# Patient Record
Sex: FEMALE | Race: WHITE | NOT HISPANIC OR LATINO | Employment: OTHER | URBAN - METROPOLITAN AREA
[De-identification: names, ages, dates, MRNs, and addresses within clinical notes are randomized per-mention and may not be internally consistent; named-entity substitution may affect disease eponyms.]

---

## 2021-01-25 ENCOUNTER — RECORDS - HEALTHEAST (OUTPATIENT)
Dept: LAB | Facility: CLINIC | Age: 86
End: 2021-01-25

## 2021-01-26 LAB
25(OH)D3 SERPL-MCNC: 41.7 NG/ML (ref 30–80)
ALBUMIN SERPL-MCNC: 3.5 G/DL (ref 3.5–5)
ALP SERPL-CCNC: 61 U/L (ref 45–120)
ALT SERPL W P-5'-P-CCNC: 15 U/L (ref 0–45)
ANION GAP SERPL CALCULATED.3IONS-SCNC: 14 MMOL/L (ref 5–18)
AST SERPL W P-5'-P-CCNC: 23 U/L (ref 0–40)
BILIRUB SERPL-MCNC: 0.7 MG/DL (ref 0–1)
BUN SERPL-MCNC: 12 MG/DL (ref 8–28)
CALCIUM SERPL-MCNC: 8.5 MG/DL (ref 8.5–10.5)
CHLORIDE BLD-SCNC: 104 MMOL/L (ref 98–107)
CO2 SERPL-SCNC: 18 MMOL/L (ref 22–31)
CREAT SERPL-MCNC: 0.8 MG/DL (ref 0.6–1.1)
ERYTHROCYTE [DISTWIDTH] IN BLOOD BY AUTOMATED COUNT: 12.7 % (ref 11–14.5)
GFR SERPL CREATININE-BSD FRML MDRD: >60 ML/MIN/1.73M2
GLUCOSE BLD-MCNC: 103 MG/DL (ref 70–125)
HCT VFR BLD AUTO: 40.1 % (ref 35–47)
HGB BLD-MCNC: 12.8 G/DL (ref 12–16)
MCH RBC QN AUTO: 28.8 PG (ref 27–34)
MCHC RBC AUTO-ENTMCNC: 31.9 G/DL (ref 32–36)
MCV RBC AUTO: 90 FL (ref 80–100)
PLATELET # BLD AUTO: 268 THOU/UL (ref 140–440)
PMV BLD AUTO: 9 FL (ref 8.5–12.5)
POTASSIUM BLD-SCNC: 4.5 MMOL/L (ref 3.5–5)
PROT SERPL-MCNC: 6.4 G/DL (ref 6–8)
RBC # BLD AUTO: 4.45 MILL/UL (ref 3.8–5.4)
SODIUM SERPL-SCNC: 136 MMOL/L (ref 136–145)
TSH SERPL DL<=0.005 MIU/L-ACNC: 2.91 UIU/ML (ref 0.3–5)
VIT B12 SERPL-MCNC: 686 PG/ML (ref 213–816)
WBC: 6.8 THOU/UL (ref 4–11)

## 2021-07-02 ENCOUNTER — HOSPITAL ENCOUNTER (INPATIENT)
Facility: CLINIC | Age: 86
LOS: 4 days | Discharge: NURSING, REHAB OR LONG-TERM CARE | DRG: 536 | End: 2021-07-06
Attending: EMERGENCY MEDICINE | Admitting: INTERNAL MEDICINE
Payer: MEDICARE

## 2021-07-02 ENCOUNTER — APPOINTMENT (OUTPATIENT)
Dept: CT IMAGING | Facility: CLINIC | Age: 86
DRG: 536 | End: 2021-07-02
Attending: EMERGENCY MEDICINE
Payer: MEDICARE

## 2021-07-02 ENCOUNTER — DOCUMENTATION ONLY (OUTPATIENT)
Dept: OTHER | Facility: CLINIC | Age: 86
End: 2021-07-02

## 2021-07-02 ENCOUNTER — APPOINTMENT (OUTPATIENT)
Dept: GENERAL RADIOLOGY | Facility: CLINIC | Age: 86
DRG: 536 | End: 2021-07-02
Attending: EMERGENCY MEDICINE
Payer: MEDICARE
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
## 2021-07-02 DIAGNOSIS — F41.9 ANXIETY: Primary | ICD-10-CM

## 2021-07-02 DIAGNOSIS — S32.592A CLOSED FRACTURE OF RAMUS OF LEFT PUBIS, INITIAL ENCOUNTER (H): ICD-10-CM

## 2021-07-02 LAB
ANION GAP SERPL CALCULATED.3IONS-SCNC: <1 MMOL/L (ref 3–14)
BASOPHILS # BLD AUTO: 0.1 10E9/L (ref 0–0.2)
BASOPHILS NFR BLD AUTO: 0.7 %
BUN SERPL-MCNC: 14 MG/DL (ref 7–30)
CALCIUM SERPL-MCNC: 8.6 MG/DL (ref 8.5–10.1)
CHLORIDE SERPL-SCNC: 100 MMOL/L (ref 94–109)
CO2 SERPL-SCNC: 33 MMOL/L (ref 20–32)
CREAT SERPL-MCNC: 0.88 MG/DL (ref 0.52–1.04)
DIFFERENTIAL METHOD BLD: ABNORMAL
EOSINOPHIL # BLD AUTO: 0.5 10E9/L (ref 0–0.7)
EOSINOPHIL NFR BLD AUTO: 5.2 %
ERYTHROCYTE [DISTWIDTH] IN BLOOD BY AUTOMATED COUNT: 13.2 % (ref 10–15)
GFR SERPL CREATININE-BSD FRML MDRD: 59 ML/MIN/{1.73_M2}
GLUCOSE SERPL-MCNC: 109 MG/DL (ref 70–99)
HCT VFR BLD AUTO: 37.4 % (ref 35–47)
HGB BLD-MCNC: 11.7 G/DL (ref 11.7–15.7)
IMM GRANULOCYTES # BLD: 0.1 10E9/L (ref 0–0.4)
IMM GRANULOCYTES NFR BLD: 1.2 %
LABORATORY COMMENT REPORT: NORMAL
LYMPHOCYTES # BLD AUTO: 1.1 10E9/L (ref 0.8–5.3)
LYMPHOCYTES NFR BLD AUTO: 12.8 %
MCH RBC QN AUTO: 29.3 PG (ref 26.5–33)
MCHC RBC AUTO-ENTMCNC: 31.3 G/DL (ref 31.5–36.5)
MCV RBC AUTO: 94 FL (ref 78–100)
MONOCYTES # BLD AUTO: 0.6 10E9/L (ref 0–1.3)
MONOCYTES NFR BLD AUTO: 7.4 %
NEUTROPHILS # BLD AUTO: 6.3 10E9/L (ref 1.6–8.3)
NEUTROPHILS NFR BLD AUTO: 72.7 %
NRBC # BLD AUTO: 0 10*3/UL
NRBC BLD AUTO-RTO: 0 /100
PLATELET # BLD AUTO: 206 10E9/L (ref 150–450)
POTASSIUM SERPL-SCNC: 4.2 MMOL/L (ref 3.4–5.3)
RBC # BLD AUTO: 4 10E12/L (ref 3.8–5.2)
SARS-COV-2 RNA RESP QL NAA+PROBE: NEGATIVE
SODIUM SERPL-SCNC: 130 MMOL/L (ref 133–144)
SPECIMEN SOURCE: NORMAL
WBC # BLD AUTO: 8.7 10E9/L (ref 4–11)

## 2021-07-02 PROCEDURE — 120N000001 HC R&B MED SURG/OB

## 2021-07-02 PROCEDURE — 73502 X-RAY EXAM HIP UNI 2-3 VIEWS: CPT

## 2021-07-02 PROCEDURE — 250N000013 HC RX MED GY IP 250 OP 250 PS 637: Performed by: PHYSICIAN ASSISTANT

## 2021-07-02 PROCEDURE — 99223 1ST HOSP IP/OBS HIGH 75: CPT | Mod: AI | Performed by: INTERNAL MEDICINE

## 2021-07-02 PROCEDURE — 99285 EMERGENCY DEPT VISIT HI MDM: CPT | Mod: 25

## 2021-07-02 PROCEDURE — 250N000011 HC RX IP 250 OP 636: Performed by: PHYSICIAN ASSISTANT

## 2021-07-02 PROCEDURE — 87635 SARS-COV-2 COVID-19 AMP PRB: CPT | Performed by: EMERGENCY MEDICINE

## 2021-07-02 PROCEDURE — C9803 HOPD COVID-19 SPEC COLLECT: HCPCS

## 2021-07-02 PROCEDURE — 99207 PR APP CREDIT; MD BILLING SHARED VISIT: CPT | Performed by: PHYSICIAN ASSISTANT

## 2021-07-02 PROCEDURE — 85025 COMPLETE CBC W/AUTO DIFF WBC: CPT | Performed by: EMERGENCY MEDICINE

## 2021-07-02 PROCEDURE — 80048 BASIC METABOLIC PNL TOTAL CA: CPT | Performed by: EMERGENCY MEDICINE

## 2021-07-02 PROCEDURE — 250N000013 HC RX MED GY IP 250 OP 250 PS 637: Performed by: EMERGENCY MEDICINE

## 2021-07-02 PROCEDURE — 70450 CT HEAD/BRAIN W/O DYE: CPT | Mod: MG

## 2021-07-02 RX ORDER — OXYCODONE HYDROCHLORIDE 5 MG/1
5 TABLET ORAL
Status: DISCONTINUED | OUTPATIENT
Start: 2021-07-02 | End: 2021-07-06 | Stop reason: HOSPADM

## 2021-07-02 RX ORDER — LISINOPRIL 5 MG/1
5 TABLET ORAL 2 TIMES DAILY
Status: DISCONTINUED | OUTPATIENT
Start: 2021-07-02 | End: 2021-07-06 | Stop reason: HOSPADM

## 2021-07-02 RX ORDER — ONDANSETRON 2 MG/ML
4 INJECTION INTRAMUSCULAR; INTRAVENOUS EVERY 6 HOURS PRN
Status: DISCONTINUED | OUTPATIENT
Start: 2021-07-02 | End: 2021-07-06 | Stop reason: HOSPADM

## 2021-07-02 RX ORDER — QUETIAPINE FUMARATE 25 MG/1
25 TABLET, FILM COATED ORAL 2 TIMES DAILY PRN
Status: DISCONTINUED | OUTPATIENT
Start: 2021-07-02 | End: 2021-07-06 | Stop reason: HOSPADM

## 2021-07-02 RX ORDER — DONEPEZIL HYDROCHLORIDE 10 MG/1
10 TABLET, FILM COATED ORAL AT BEDTIME
Status: DISCONTINUED | OUTPATIENT
Start: 2021-07-02 | End: 2021-07-06 | Stop reason: HOSPADM

## 2021-07-02 RX ORDER — NALOXONE HYDROCHLORIDE 0.4 MG/ML
0.4 INJECTION, SOLUTION INTRAMUSCULAR; INTRAVENOUS; SUBCUTANEOUS
Status: DISCONTINUED | OUTPATIENT
Start: 2021-07-02 | End: 2021-07-06 | Stop reason: HOSPADM

## 2021-07-02 RX ORDER — ALPRAZOLAM 0.25 MG
0.25 TABLET ORAL
COMMUNITY
End: 2021-07-09

## 2021-07-02 RX ORDER — POLYETHYLENE GLYCOL 3350 17 G/17G
17 POWDER, FOR SOLUTION ORAL DAILY
Status: DISCONTINUED | OUTPATIENT
Start: 2021-07-02 | End: 2021-07-06 | Stop reason: HOSPADM

## 2021-07-02 RX ORDER — LORAZEPAM 2 MG/ML
0.5 INJECTION INTRAMUSCULAR DAILY PRN
Status: DISCONTINUED | OUTPATIENT
Start: 2021-07-02 | End: 2021-07-06 | Stop reason: HOSPADM

## 2021-07-02 RX ORDER — LIDOCAINE 40 MG/G
CREAM TOPICAL
Status: DISCONTINUED | OUTPATIENT
Start: 2021-07-02 | End: 2021-07-06 | Stop reason: HOSPADM

## 2021-07-02 RX ORDER — IBUPROFEN 400 MG/1
400 TABLET, FILM COATED ORAL 2 TIMES DAILY PRN
Status: DISCONTINUED | OUTPATIENT
Start: 2021-07-02 | End: 2021-07-06 | Stop reason: HOSPADM

## 2021-07-02 RX ORDER — DONEPEZIL HYDROCHLORIDE 10 MG/1
10 TABLET, FILM COATED ORAL AT BEDTIME
COMMUNITY

## 2021-07-02 RX ORDER — ACETAMINOPHEN 500 MG
1000 TABLET ORAL 2 TIMES DAILY
Status: ON HOLD | COMMUNITY
End: 2021-07-06

## 2021-07-02 RX ORDER — OXYBUTYNIN CHLORIDE 5 MG/1
5 TABLET ORAL 3 TIMES DAILY
Status: DISCONTINUED | OUTPATIENT
Start: 2021-07-02 | End: 2021-07-06 | Stop reason: HOSPADM

## 2021-07-02 RX ORDER — ACETAMINOPHEN 500 MG
1000 TABLET ORAL 3 TIMES DAILY
COMMUNITY

## 2021-07-02 RX ORDER — ACETAMINOPHEN 500 MG
1000 TABLET ORAL ONCE
Status: COMPLETED | OUTPATIENT
Start: 2021-07-02 | End: 2021-07-02

## 2021-07-02 RX ORDER — MULTIVITAMIN,THERAPEUTIC
1 TABLET ORAL DAILY
COMMUNITY

## 2021-07-02 RX ORDER — METOPROLOL TARTRATE 50 MG
50 TABLET ORAL 2 TIMES DAILY
COMMUNITY

## 2021-07-02 RX ORDER — METOPROLOL TARTRATE 50 MG
50 TABLET ORAL 2 TIMES DAILY
Status: DISCONTINUED | OUTPATIENT
Start: 2021-07-02 | End: 2021-07-06 | Stop reason: HOSPADM

## 2021-07-02 RX ORDER — SODIUM PHOSPHATE,MONO-DIBASIC 19G-7G/118
1 ENEMA (ML) RECTAL DAILY
COMMUNITY

## 2021-07-02 RX ORDER — OLANZAPINE 5 MG/1
5 TABLET, ORALLY DISINTEGRATING ORAL DAILY PRN
Status: DISCONTINUED | OUTPATIENT
Start: 2021-07-02 | End: 2021-07-06 | Stop reason: HOSPADM

## 2021-07-02 RX ORDER — LISINOPRIL 5 MG/1
5 TABLET ORAL 2 TIMES DAILY
COMMUNITY

## 2021-07-02 RX ORDER — NALOXONE HYDROCHLORIDE 0.4 MG/ML
0.2 INJECTION, SOLUTION INTRAMUSCULAR; INTRAVENOUS; SUBCUTANEOUS
Status: DISCONTINUED | OUTPATIENT
Start: 2021-07-02 | End: 2021-07-06 | Stop reason: HOSPADM

## 2021-07-02 RX ORDER — OXYBUTYNIN CHLORIDE 5 MG/1
5 TABLET ORAL 3 TIMES DAILY
COMMUNITY

## 2021-07-02 RX ORDER — ALPRAZOLAM 0.25 MG
0.25 TABLET ORAL
Status: DISCONTINUED | OUTPATIENT
Start: 2021-07-02 | End: 2021-07-06 | Stop reason: HOSPADM

## 2021-07-02 RX ORDER — AMOXICILLIN 250 MG
1 CAPSULE ORAL 2 TIMES DAILY PRN
Status: DISCONTINUED | OUTPATIENT
Start: 2021-07-02 | End: 2021-07-06 | Stop reason: HOSPADM

## 2021-07-02 RX ORDER — ONDANSETRON 4 MG/1
4 TABLET, ORALLY DISINTEGRATING ORAL EVERY 6 HOURS PRN
Status: DISCONTINUED | OUTPATIENT
Start: 2021-07-02 | End: 2021-07-06 | Stop reason: HOSPADM

## 2021-07-02 RX ORDER — AMOXICILLIN 250 MG
2 CAPSULE ORAL 2 TIMES DAILY PRN
Status: DISCONTINUED | OUTPATIENT
Start: 2021-07-02 | End: 2021-07-06 | Stop reason: HOSPADM

## 2021-07-02 RX ORDER — ACETAMINOPHEN 325 MG/1
975 TABLET ORAL 3 TIMES DAILY
Status: DISCONTINUED | OUTPATIENT
Start: 2021-07-02 | End: 2021-07-06 | Stop reason: HOSPADM

## 2021-07-02 RX ADMIN — ENOXAPARIN SODIUM 40 MG: 40 INJECTION SUBCUTANEOUS at 19:40

## 2021-07-02 RX ADMIN — OXYBUTYNIN CHLORIDE 5 MG: 5 TABLET ORAL at 19:44

## 2021-07-02 RX ADMIN — OLANZAPINE 5 MG: 5 TABLET, ORALLY DISINTEGRATING ORAL at 18:51

## 2021-07-02 RX ADMIN — LISINOPRIL 5 MG: 5 TABLET ORAL at 19:35

## 2021-07-02 RX ADMIN — SERTRALINE HYDROCHLORIDE 50 MG: 50 TABLET ORAL at 21:44

## 2021-07-02 RX ADMIN — ACETAMINOPHEN 1000 MG: 500 TABLET, FILM COATED ORAL at 16:08

## 2021-07-02 RX ADMIN — METOPROLOL TARTRATE 50 MG: 50 TABLET, FILM COATED ORAL at 19:35

## 2021-07-02 RX ADMIN — ACETAMINOPHEN 975 MG: 325 TABLET, FILM COATED ORAL at 19:35

## 2021-07-02 RX ADMIN — ALPRAZOLAM 0.25 MG: 0.25 TABLET ORAL at 19:35

## 2021-07-02 RX ADMIN — DONEPEZIL HYDROCHLORIDE 10 MG: 10 TABLET ORAL at 21:44

## 2021-07-02 RX ADMIN — IBUPROFEN 400 MG: 400 TABLET, FILM COATED ORAL at 18:52

## 2021-07-02 ASSESSMENT — MIFFLIN-ST. JEOR: SCORE: 1187.88

## 2021-07-02 ASSESSMENT — ENCOUNTER SYMPTOMS: BACK PAIN: 0

## 2021-07-02 ASSESSMENT — ACTIVITIES OF DAILY LIVING (ADL): ADLS_ACUITY_SCORE: 23

## 2021-07-02 NOTE — ED TRIAGE NOTES
Pt arrived by EMS. Pt has hx of Alzheimer. Pt stated that what she remembered walking and all of a sudden she tripped and fell. Pt hit lower back but denies any pain. Pt stated that she didn't feel dizzy or lightheaded before tripping and falling. Pt arrived in C collar. Not in blood thinners.

## 2021-07-02 NOTE — PHARMACY-ADMISSION MEDICATION HISTORY
Admission medication history interview status for this patient is complete. See [facility] admission navigator for allergy information, prior to admission medications and immunization status.     Medication history interview done, indicate source(s): Patient  Medication history resources (including written lists, pill bottles, clinic record):[facility]  Pharmacy: -    Changes made to PTA medication list:  Added: all listed  Deleted: -  Changed: -    Actions taken by pharmacist (provider contacted, etc):None     Additional medication history information:None    Medication reconciliation/reorder completed by provider prior to medication history?  no   (Y/N)     For patients on insulin therapy:   Do you use sliding scale insulin based on blood sugars?   What is your pre-meal insulin coverage?    Do you typically eat three meals a day?   How many times do you check your blood glucose per day?   How many episodes of hypoglycemia do you typically have per month?   Do you have a Continuous Glucose Monitor (CGM)?      Prior to Admission medications    Medication Sig Last Dose Taking? Auth Provider   acetaminophen (TYLENOL) 500 MG tablet Take 1,000 mg by mouth 2 times daily 7/2/2021 at x1 Yes Unknown, Entered By History   acetaminophen (TYLENOL) 500 MG tablet Take 1,000 mg by mouth daily as needed for mild pain  Yes Unknown, Entered By History   ALPRAZolam (XANAX) 0.25 MG tablet Take 0.25 mg by mouth daily at 4pm 7/1/2021 at Unknown time Yes Unknown, Entered By History   Calcium Carbonate (CALCIUM-CARB 600 PO) Take 1 tablet by mouth 2 times daily 7/2/2021 at x1 Yes Unknown, Entered By History   donepezil (ARICEPT) 10 MG tablet Take 10 mg by mouth At Bedtime 7/1/2021 at Unknown time Yes Unknown, Entered By History   glucosamine-chondroitin 500-400 MG CAPS per capsule Take 1 capsule by mouth daily 7/2/2021 at Unknown time Yes Unknown, Entered By History   lisinopril (ZESTRIL) 5 MG tablet Take 5 mg by mouth 2 times daily 7/2/2021 at x1  Yes Unknown, Entered By History   metoprolol tartrate (LOPRESSOR) 50 MG tablet Take 50 mg by mouth 2 times daily 7/2/2021 at x1 Yes Unknown, Entered By History   multivitamin, therapeutic (THERA-VIT) TABS tablet Take 1 tablet by mouth daily 7/2/2021 at Unknown time Yes Unknown, Entered By History   oxybutynin (DITROPAN) 5 MG tablet Take 5 mg by mouth 3 times daily  Yes Unknown, Entered By History   sertraline (ZOLOFT) 50 MG tablet Take 50 mg by mouth At Bedtime 7/1/2021 at Unknown time Yes Unknown, Entered By History

## 2021-07-02 NOTE — ED NOTES
[facility]  ED Nurse Handoff Report    Alejandra Paulino is a 88 year old female   ED Chief complaint: Fall  . ED Diagnosis:   Final diagnoses:   Closed fracture of ramus of left pubis, initial encounter (H)   Fall, initial encounter     Allergies: No Known Allergies    Code Status: Full Code  Activity level - Baseline/Home:  Independent. But family report that she SHOULD use a cane/walker Activity Level - Current:   Assist X 2.  Lift room needed: No. Bariatric: No   Needed: No   Isolation: No. Infection: Not Applicable.     Vital Signs:   Vitals:    07/02/21 1415 07/02/21 1430 07/02/21 1515 07/02/21 1600   BP: 135/62 130/68 137/64 118/66   Pulse: 76 71 72 82   Resp:       Temp:       TempSrc:       SpO2: 94% 93% 94% 94%   Height:           Cardiac Rhythm:  ,      Pain level:    Patient confused: Yes. Patient Falls Risk: Yes.   Elimination Status: Has voided   Patient Report - Initial Complaint: fall. Focused Assessment:Alejandra Roldan is a 88 year old female with history of Alzheimer who presents for the evaluation after a fall. Per EMS, the patient tripped on a curb earlier today at her nursing facility [facility] and they were concerned with patient hitting her head, therefore c-collar was applied. She stated remembering that she was walking and all of a sudden tripped and fell hitting her lower back, but on exam here denies any pain. She denied feeling dizzy or lightheaded prior to tripping and falling. She denies being on blood thinners.L hip pain, CMS intact.    Tests Performed: labs, head CT, pelvis XR. Abnormal Results:   Labs Ordered and Resulted from Time of ED Arrival Up to the Time of Departure from the ED   CBC WITH PLATELETS DIFFERENTIAL - Abnormal; Notable for the following components:       Result Value    MCHC 31.3 (*)     All other components within normal limits   BASIC METABOLIC PANEL - Abnormal; Notable for the following components:    Sodium 130 (*)     Carbon  Dioxide 33 (*)     Anion Gap <1 (*)     Glucose 109 (*)     GFR Estimate 59 (*)     All other components within normal limits   SARS-COV-2 (COVID-19) VIRUS RT-PCR     XR Pelvis and Hip Left 1 View   Final Result   IMPRESSION: Left superior and inferior pubic ramus fractures. This   raises the possibility of additional, otherwise occult pelvic ring or   sacral injury. No evidence of left hip fracture or joint space   narrowing.      TIM NICOLE MD             SYSTEM ID:  [id number]      CT Head w/o Contrast   Final Result   IMPRESSION:  Diffuse cerebral volume loss and cerebral white matter   changes consistent with chronic small vessel ischemic disease. No   evidence for acute intracranial pathology.         Radiation dose for this scan was reduced using automated exposure   control, adjustment of the mA and/or kV according to patient size, or   iterative reconstruction technique.      GRACIA LÓPEZ MD             SYSTEM ID:  [id number]      .   Treatments provided: tylenol  Family Comments: daughter at bedside and is up to date on POC.  OBS brochure/video discussed/provided to patient:  No  ED Medications:   Medications   acetaminophen (TYLENOL) tablet 1,000 mg (1,000 mg Oral Given 7/2/21 1608)     Drips infusing:  No  For the majority of the shift, the patient's behavior Green. Interventions performed were n/a.    Sepsis treatment initiated: No     Patient tested for COVID 19 prior to admission: YES    ED Nurse Name/Phone Number: Carla Jenkins RN,   4:48 PM    RECEIVING UNIT ED HANDOFF REVIEW    Above ED Nurse Handoff Report was reviewed: Yes  Reviewed by: Mandy Luciano RN on July 2, 2021 at 5:19 PM

## 2021-07-02 NOTE — ED PROVIDER NOTES
"  History   Chief Complaint:  Fall       HPI   Alejandra Roldan is a 88 year old female with history of Alzheimer who presents for the evaluation after a fall. Per EMS, the patient tripped on a curb earlier today at her nursing facility [facility] and they were concerned with patient hitting her head, therefore c-collar was applied. She stated remembering that she was walking and all of a sudden tripped and fell hitting her lower back, but on exam here denies any pain. She denied feeling dizzy or lightheaded prior to tripping and falling. She denies being on blood thinners.     Review of Systems   Unable to perform ROS: Dementia   Musculoskeletal: Negative for back pain.     Allergies:  Allergies have not been reviewed    Medications:  No current outpatient medications on file.    Past Medical History:    No past medical history on file.    Past Surgical History:    No past surgical history on file.     Family History:    No family history on file.    Social History:  The patient was brought to the emergency department by EMS.  The patient presents to the emergency department with her daughter.  The patient lives at nursing facility.    Physical Exam     Patient Vitals for the past 24 hrs:   BP Temp Temp src Pulse Resp SpO2 Height   07/02/21 1600 118/66 -- -- 82 -- 94 % --   07/02/21 1515 137/64 -- -- 72 -- 94 % --   07/02/21 1430 130/68 -- -- 71 -- 93 % --   07/02/21 1415 135/62 -- -- 76 -- 94 % --   07/02/21 1408 137/62 98.4  F (36.9  C) Oral 76 16 92 % 1.626 m (5' 4\")   07/02/21 1400 137/62 -- -- 69 -- 92 % --       Physical Exam  Constitutional: Alert, attentive, confused to recent events at baseline, GCS 15  HENT:    Nose: Nose normal.    Mouth/Throat: Oropharynx is clear, mucous membranes are moist    TMs clear bilaterally  Eyes: EOM are normal.   CV: regular rate and rhythm; no murmurs, rubs or gallups  Chest: Effort normal and breath sounds normal.   GI:  There is no tenderness. No distension. Normal bowel " sounds  MSK: Normal range of motion to the upper and lower extremities, vague left pelvis and hip tenderness   Pelvis stable   Unable to sit up or stand without left pelvic / hip tenderness   C-spine cleared by NEXUS   No tenderness or stepoffs to the C/T/L-spine   Normal, atraumatic inspection to the back   Neurological: Alert, attentive  Skin: Skin is warm and dry.      Emergency Department Course     Imaging:    XR Pelvis and Hip Left 1 View  Left superior and inferior pubic ramus fractures. This  raises the possibility of additional, otherwise occult pelvic ring or  sacral injury. No evidence of left hip fracture or joint space  narrowing.    TIM NICOLE MD      CT Head w/o Contrast  Diffuse cerebral volume loss and cerebral white matter  changes consistent with chronic small vessel ischemic disease. No  evidence for acute intracranial pathology.    GRACIA LÓPEZ MD      Laboratory:    CBC: WBC 8.7, HGB 11.7,      BMP: Glucose 109 (H), Sodium: 130 (L), Carbon Dioxide: 33 (H), GFR: 59 (L), Anion gap: <1 (L) o/w WNL (Creatinine: 0.88)    Asymptomatic COVID-19 Virus (Coronavirus) by PCR Nasopharyngeal swab: Negative    Emergency Department Course:    Reviewed:  I reviewed nursing notes, vitals, past medical history    Assessments/Consults    1412 I obtained history and examined the patient as noted above.     Interventions:  1608 Tylenol 1000 mg oral    Disposition:  The patient was admitted to the hospital under the care of Dr. Gregory.       Impression & Plan     Medical Decision Making:  This is a pleasant 88-year-old female with history of Alzheimer's who presents after witnessed fall onto the left buttock and hip with concern for closed head injury.  She has a normal neurologic exam.  Given the above, CT head was performed and shows no skull fracture, intracranial hemorrhage, or other acute process.  C-spine is cleared by Nexus criteria.  X-ray of the left hip shows superior and inferior pubic ramus  fractures.  She is unable to sit up and ambulates will be admitted to inpatient medicine for PT/OT, orthopedic consult, likely TCU placement.  CT pelvis deferred to the inpatient team based on x-ray findings.  Screening labs are reassuring and believe she is safe for admission at this time.    Covid-19  Alejandra Roldan was evaluated during a global COVID-19 pandemic, which necessitated consideration that the patient might be at risk for infection with the SARS-CoV-2 virus that causes COVID-19.   Applicable protocols for evaluation were followed during the patient's care.   COVID-19 was considered as part of the patient's evaluation. The plan for testing is:  a test was obtained during this visit.    Diagnosis:    ICD-10-CM    1. Closed fracture of ramus of left pubis, initial encounter (H)  S32.592A Asymptomatic SARS-CoV-2 COVID-19 Virus (Coronavirus) by PCR   2. Fall, initial encounter  W19.XXXA        Scribe Disclosure:  Roby PETE, am serving as a scribe at 2:13 PM on 7/2/2021 to document services personally performed by Owen Rhodes MD based on my observations and the provider's statements to me.              Owen Rhodes MD  07/02/21 [id number]

## 2021-07-02 NOTE — H&P
History and Physical     Alejandra Paulino MRN# [id number]   YOB: 1933 Age: 88 year old      Date of Admission:  7/2/2021    Primary care provider: Shruthi Hernandez Physician          Assessment and Plan:   Alejandra Paulino is a 88 year old female with a PMH significant for dementia living in a memory care unit, hypertension and pacemaker placement who presents after mechanical fall with left hip pain.    Patient was discussed with Dr. Rhodes, who was provider in ED. Chart review of ED work up was reviewed as well as chart review of Care Everywhere, previous visits and admissions.     #Intractable left hip pain  #Left inferior and superior pubic rami fracture  Patient tripped over a curb today landing on her left hip.  She was able to ambulate to the ambulance but since that time has been complaining of intractable left hip pain and inability to stand.  X-ray imaging reveals left superior and inferior pubic rami fractures.  My exam she is still able to passively move the leg with rotation of the hip without severe pain so doubt hip fracture.  She does have pain when she does a straight leg lift on the left side.  -Orthopedics consult  -Consider further imaging if unable to bear weight after multimodal pain regimen  -Schedule Tylenol  -As needed ibuprofen  -Since I am starting ibuprofen will also add omeprazole  -Ice hip  -Oxycodone available for severe pain  -PT and social work consults    #Alzheimer's dementia  -Continue evening Xanax  -Continue Aricept and Zoloft  -PRN Seroquel, Zyprexa and Ativan ordered for agitation    #Pacemaker placement  Records do not indicate what her underlying rhythm was that necessitated a pacemaker placement    #HTN  -Continue metoprolol 50 mg twice daily and lisinopril 5 mg daily        Social: Lives in memory care  Code: Discussed with patient and they have chosen DNR/DNI  VTE prophylaxis: Lovenox  Disposition: Inpatient                    Chief Complaint:    Left hip pain         History of Present Illness:   History is limited as patient has severe dementia.  History is provided by the patient, her daughter and the ER provider.    Alejandra Paulino is a 88 year old female who presents after mechanical fall with left hip pain.  She does not have recollection of this but per the ER note she tripped on a curb earlier today while at her memory care facility and she hit her head.  She was able to ambulate to the ambulance but since being in the ER has been complaining of left hip pain and inability to stand.  She denies shortness of breath, cough, chest pain, abdominal pain, nausea, vomiting, headache, lightheadedness, dizziness, diarrhea and urinary symptoms.  She does not smoke cigarettes or drink alcohol daily.             Past Medical History:   Dementia  Hypertension  Arrhythmia with pacemaker placement            Past Surgical History:   Pacemaker placement          Social History:     Social History     Socioeconomic History     Marital status:      Spouse name: Not on file     Number of children: Not on file     Years of education: Not on file     Highest education level: Not on file   Occupational History     Not on file   Social Needs     Financial resource strain: Not on file     Food insecurity     Worry: Not on file     Inability: Not on file     Transportation needs     Medical: Not on file     Non-medical: Not on file   Tobacco Use     Smoking status: Not on file   Substance and Sexual Activity     Alcohol use: Not on file     Drug use: Not on file     Sexual activity: Not on file   Lifestyle     Physical activity     Days per week: Not on file     Minutes per session: Not on file     Stress: Not on file   Relationships     Social connections     Talks on phone: Not on file     Gets together: Not on file     Attends Mandaen service: Not on file     Active member of club or organization: Not on file     Attends meetings of clubs or organizations:  "Not on file     Relationship status: Not on file     Intimate partner violence     Fear of current or ex partner: Not on file     Emotionally abused: Not on file     Physically abused: Not on file     Forced sexual activity: Not on file   Other Topics Concern     Not on file   Social History Narrative     Not on file               Family History:   Family history reviewed and is non contributory         Allergies:    No Known Allergies            Medications:     Prior to Admission medications    Not on File              Review of Systems:   A Comprehensive greater than 10 system review of systems was carried out.  Pertinent positives and negatives are noted above.  Otherwise negative for contributory information.            Physical Exam:   Blood pressure (!) 141/73, pulse 95, temperature 97.8  F (36.6  C), temperature source Temporal, resp. rate 16, height 1.626 m (5' 4\"), weight 77.3 kg (170 lb 6.2 oz), SpO2 94 %.  Exam:  GENERAL:  Comfortable.  PSYCH: pleasant, oriented, No acute distress.  HEENT:  PERRLA. Normal conjunctiva, normal hearing, nasal mucosa and Oropharynx are normal.  NECK:  Supple, no neck vein distention, adenopathy or bruits, normal thyroid.  HEART:  Normal S1, S2 with no murmur, no pericardial rub, gallops or S3 or S4.  LUNGS:  Clear to auscultation, normal Respiratory effort. No wheezing, rales or ronchi.  ABDOMEN:  Soft, no hepatosplenomegaly, normal bowel sounds. Non-tender, non distended.   EXTREMITIES:  No pedal edema, +2 pulses bilateral and equal.  Left hip pain with straight leg raise but able to bend leg and externally rotate at thigh without severe pain.  SKIN:  Dry to touch, No rash, wound or ulcerations.  NEUROLOGIC:  CN 2-12 grossly intact, sensation is intact with no focal deficits.               Data:     Recent Labs   Lab 07/02/21  1534   WBC 8.7   HGB 11.7   HCT 37.4   MCV 94        Recent Labs   Lab 07/02/21  1534   *   POTASSIUM 4.2   CHLORIDE 100   CO2 33* "   ANIONGAP <1*   *   BUN 14   CR 0.88   GFRESTIMATED 59*   GFRESTBLACK 68   EFREM 8.6     No results for input(s): COLOR, APPEARANCE, URINEGLC, URINEBILI, URINEKETONE, SG, UBLD, URINEPH, PROTEIN, UROBILINOGEN, NITRITE, LEUKEST, RBCU, WBCU in the last 168 hours.      Recent Results (from the past 24 hour(s))   CT Head w/o Contrast    Narrative    CT OF THE HEAD WITHOUT CONTRAST 7/2/2021 3:04 PM     COMPARISON: None    HISTORY: Head injury.    TECHNIQUE: 5 mm thick axial CT images of the head were acquired  without IV contrast material.    FINDINGS:  There is moderate diffuse cerebral volume loss. There are  extensive confluent areas of decreased density in the cerebral white  matter bilaterally that are consistent with sequela of chronic small  vessel ischemic disease.     The ventricles and basal cisterns are within normal limits in  configuration given the degree of cerebral volume loss.  There is no  midline shift. There are no extra-axial fluid collections.     No intracranial hemorrhage, mass or recent infarct.    The visualized paranasal sinuses are well-aerated. There is no  mastoiditis. There are no fractures of the visualized bones.       Impression    IMPRESSION:  Diffuse cerebral volume loss and cerebral white matter  changes consistent with chronic small vessel ischemic disease. No  evidence for acute intracranial pathology.      Radiation dose for this scan was reduced using automated exposure  control, adjustment of the mA and/or kV according to patient size, or  iterative reconstruction technique.    GRACIA LÓPEZ MD          SYSTEM ID:  [id number]   XR Pelvis and Hip Left 1 View    Narrative    XR PELVIS AND HIP LEFT 1 VIEW 7/2/2021 3:47 PM     HISTORY: L hip pain      Impression    IMPRESSION: Left superior and inferior pubic ramus fractures. This  raises the possibility of additional, otherwise occult pelvic ring or  sacral injury. No evidence of left hip fracture or joint  space  narrowing.    TIM NIOCLE MD          SYSTEM ID:  [id number]         Gladis Cary PA-C    This patient was seen and discussed with Dr. Newton who agrees with the current plans as outlined above.

## 2021-07-03 LAB
ANION GAP SERPL CALCULATED.3IONS-SCNC: 7 MMOL/L (ref 3–14)
BUN SERPL-MCNC: 20 MG/DL (ref 7–30)
CALCIUM SERPL-MCNC: 8.4 MG/DL (ref 8.5–10.1)
CHLORIDE SERPL-SCNC: 99 MMOL/L (ref 94–109)
CO2 SERPL-SCNC: 25 MMOL/L (ref 20–32)
CREAT SERPL-MCNC: 1.09 MG/DL (ref 0.52–1.04)
ERYTHROCYTE [DISTWIDTH] IN BLOOD BY AUTOMATED COUNT: 13.2 % (ref 10–15)
GFR SERPL CREATININE-BSD FRML MDRD: 45 ML/MIN/{1.73_M2}
GLUCOSE SERPL-MCNC: 103 MG/DL (ref 70–99)
HCT VFR BLD AUTO: 35.3 % (ref 35–47)
HGB BLD-MCNC: 11 G/DL (ref 11.7–15.7)
MCH RBC QN AUTO: 30.3 PG (ref 26.5–33)
MCHC RBC AUTO-ENTMCNC: 31.2 G/DL (ref 31.5–36.5)
MCV RBC AUTO: 97 FL (ref 78–100)
PLATELET # BLD AUTO: 154 10E9/L (ref 150–450)
POTASSIUM SERPL-SCNC: 4.1 MMOL/L (ref 3.4–5.3)
RBC # BLD AUTO: 3.63 10E12/L (ref 3.8–5.2)
SODIUM SERPL-SCNC: 131 MMOL/L (ref 133–144)
WBC # BLD AUTO: 9.8 10E9/L (ref 4–11)

## 2021-07-03 PROCEDURE — 250N000011 HC RX IP 250 OP 636: Performed by: PHYSICIAN ASSISTANT

## 2021-07-03 PROCEDURE — 85027 COMPLETE CBC AUTOMATED: CPT | Performed by: PHYSICIAN ASSISTANT

## 2021-07-03 PROCEDURE — 36415 COLL VENOUS BLD VENIPUNCTURE: CPT | Performed by: PHYSICIAN ASSISTANT

## 2021-07-03 PROCEDURE — 120N000001 HC R&B MED SURG/OB

## 2021-07-03 PROCEDURE — 80048 BASIC METABOLIC PNL TOTAL CA: CPT | Performed by: PHYSICIAN ASSISTANT

## 2021-07-03 PROCEDURE — 99232 SBSQ HOSP IP/OBS MODERATE 35: CPT | Performed by: INTERNAL MEDICINE

## 2021-07-03 PROCEDURE — 250N000013 HC RX MED GY IP 250 OP 250 PS 637: Performed by: PHYSICIAN ASSISTANT

## 2021-07-03 RX ADMIN — POLYETHYLENE GLYCOL 3350 17 G: 17 POWDER, FOR SOLUTION ORAL at 08:24

## 2021-07-03 RX ADMIN — OXYBUTYNIN CHLORIDE 5 MG: 5 TABLET ORAL at 08:24

## 2021-07-03 RX ADMIN — METOPROLOL TARTRATE 50 MG: 50 TABLET, FILM COATED ORAL at 08:24

## 2021-07-03 RX ADMIN — ACETAMINOPHEN 975 MG: 325 TABLET, FILM COATED ORAL at 14:15

## 2021-07-03 RX ADMIN — LISINOPRIL 5 MG: 5 TABLET ORAL at 08:24

## 2021-07-03 RX ADMIN — ACETAMINOPHEN 975 MG: 325 TABLET, FILM COATED ORAL at 08:24

## 2021-07-03 RX ADMIN — ACETAMINOPHEN 975 MG: 325 TABLET, FILM COATED ORAL at 20:13

## 2021-07-03 RX ADMIN — IBUPROFEN 400 MG: 400 TABLET, FILM COATED ORAL at 09:19

## 2021-07-03 RX ADMIN — ENOXAPARIN SODIUM 40 MG: 40 INJECTION SUBCUTANEOUS at 20:16

## 2021-07-03 RX ADMIN — OXYBUTYNIN CHLORIDE 5 MG: 5 TABLET ORAL at 14:15

## 2021-07-03 RX ADMIN — OXYBUTYNIN CHLORIDE 5 MG: 5 TABLET ORAL at 20:13

## 2021-07-03 RX ADMIN — ALPRAZOLAM 0.25 MG: 0.25 TABLET ORAL at 16:59

## 2021-07-03 RX ADMIN — DONEPEZIL HYDROCHLORIDE 10 MG: 10 TABLET ORAL at 22:11

## 2021-07-03 RX ADMIN — SERTRALINE HYDROCHLORIDE 50 MG: 50 TABLET ORAL at 22:11

## 2021-07-03 RX ADMIN — OMEPRAZOLE 20 MG: 20 CAPSULE, DELAYED RELEASE ORAL at 07:00

## 2021-07-03 ASSESSMENT — ACTIVITIES OF DAILY LIVING (ADL)
ADLS_ACUITY_SCORE: 18
ADLS_ACUITY_SCORE: 23
ADLS_ACUITY_SCORE: 23
ADLS_ACUITY_SCORE: 17
ADLS_ACUITY_SCORE: 18
ADLS_ACUITY_SCORE: 18

## 2021-07-03 NOTE — PLAN OF CARE
Patient vital signs are at baseline: Yes  Patient able to ambulate as they were prior to admission or with assist devices provided by therapies during their stay:  Yes, up with 2 assist and amy steady with gait belt to the bathroom. Tolerated well.  Patient MUST void prior to discharge:  Yes  Patient able to tolerate oral intake:  Yes  Pain has adequate pain control using Oral analgesics:  Yes, tylenol.    Pt alert to self only. Sitter at bedside. Denies pain. LS clear, BS present, passing flatus. LBM - unknown. CMS intact with strong d/p flexion. Pt up with 2 assist, gait belt, and amy steady originally however is able to get up with 1 assist and amy steady with clear directions. Tolerated well. Voiding adequately. Plan is for PT and Ortho to consult today.

## 2021-07-03 NOTE — PLAN OF CARE
Patient vital signs are at baseline: Yes  Patient able to ambulate as they were prior to admission or with assist devices provided by therapies during their stay:  No  Reason: requiring amy steady  Patient MUST void prior to discharge:  Yes  Patient able to tolerate oral intake:  Yes  Pain has adequate pain control using Oral analgesics:  Yes     Pt oriented to self only.  VSS, afebrile.  Pain managed with tylenol/ibuprofen.  CMS intact.  Up with A1 and amy steady, up to chair for meals.  Tolerating regular diet.  Encouraged oral intake, pt has not urinated this shift, bladder scanned for 443cc at 1420. Ortho consult pending.  Daughter and son at bedside.  Discharge pending back to memory care vs TCU.  Will continue to monitor.

## 2021-07-03 NOTE — PROGRESS NOTES
"[facility]  Hospitalist Progress Note  Dean Graf MD, MD 07/03/2021  (Text Page)  Reason for Stay (Diagnosis): Mechanical fall found with left pubic rami fractures         Assessment and Plan:      Summary of Stay: Alejandra Paulino is a 88 year old female who is a memory care resident with a background history of Alzheimer's dementia, hypertension and prior pacemaker insertion admitted on 7/2/2021 with fall event    Problem List:   1. Earlier with intractable left hip pain  2. Left inferior and superior pubic rami fractures  3. Alzheimer's dementia  4. Prior pacemaker  5. Hypertension on metoprolol and lisinopril    Continue current hospitalization care.  PT, social service evaluation.  Pending orthopedics formal consultation  Pain level this morning appears to be much improved.  Receiving APAP, as needed ibuprofen  Minimize narcotics if able.  Patient has not received any oxycodone yet.  Has as needed Seroquel for agitation.  Fall precautions  Home regimen for dementia such as evening Xanax, Aricept, Zoloft were resumed.    DVT Prophylaxis: Enoxaparin (Lovenox) SQ  Code Status: DNR / DNI  Discharge Dispo: Likely back to prior living arrangement  Estimated Disch Date / # of Days until Disch: Likely in 1 to 2 days depending on progress with pain control, therapy        Interval History (Subjective):      I assume service care today.  Seen and examined.  Chart reviewed.  Family at bedside and provided with updates.  Has a bedside .  Reportedly tolerated out of bed to chair activities earlier with Yazmin steady.  Stable hemodynamics.  Not complaining of severe pain.  No reported nausea or vomiting.  Tolerating oral diet.  Not requiring any oxygen support.  Afebrile.                Physical Exam:      Last Vital Signs:  /49 (BP Location: Right arm)   Pulse 69   Temp 97  F (36.1  C) (Temporal)   Resp 16   Ht 1.626 m (5' 4\")   Wt 77.3 kg (170 lb 6.2 oz)   SpO2 92%   " BMI 29.25 kg/m      No intake/output data recorded.  Wt Readings from Last 1 Encounters:   07/02/21 77.3 kg (170 lb 6.2 oz)     Vitals:    07/02/21 1744   Weight: 77.3 kg (170 lb 6.2 oz)       Constitutional: Awake, alert, cooperative, no apparent distress   Respiratory: Clear to auscultation bilaterally, no crackles or wheezing   Cardiovascular: Regular rate and rhythm, normal S1 and S2   Abdomen: Normal bowel sounds, soft, non-distended, non-tender   Skin: No rashes, no cyanosis, dry to touch   Neuro: Alert and oriented x3, no weakness, spontaneous and coherent speech   Extremities: No edema, normal range of motion   Other(s): Euthymic mood, not agitated       All other systems: Negative          Medications:      All current medications were reviewed with changes reflected in problem list.         Data:      All new lab and imaging data was reviewed.   Labs:  No results for input(s): CULT in the last 168 hours.  Recent Labs   Lab 07/03/21 0627 07/02/21  1534   WBC 9.8 8.7   HGB 11.0* 11.7   HCT 35.3 37.4   MCV 97 94    206     Recent Labs   Lab 07/03/21  0627 07/02/21  1534   * 130*   POTASSIUM 4.1 4.2   CHLORIDE 99 100   CO2 25 33*   ANIONGAP 7 <1*   * 109*   BUN 20 14   CR 1.09* 0.88   GFRESTIMATED 45* 59*   GFRESTBLACK 52* 68   EFREM 8.4* 8.6     No results for input(s): SED, CRP in the last 168 hours.  Recent Labs   Lab 07/03/21 0627 07/02/21  1534   * 109*     No results for input(s): INR in the last 168 hours.  No results for input(s): CHOL, HDL, LDL, TRIG, CHOLHDLRATIO in the last 168 hours.  No results for input(s): TROPONIN, TROPI, TROPR in the last 168 hours.    Invalid input(s): TROP, TROPONINIES  No results for input(s): COLOR, APPEARANCE, URINEGLC, URINEBILI, URINEKETONE, SG, UBLD, URINEPH, PROTEIN, UROBILINOGEN, NITRITE, LEUKEST, RBCU, WBCU in the last 168 hours.   Imaging:   Results for orders placed or performed during the hospital encounter of 07/02/21   CT Head w/o  Contrast    Narrative    CT OF THE HEAD WITHOUT CONTRAST 7/2/2021 3:04 PM     COMPARISON: None    HISTORY: Head injury.    TECHNIQUE: 5 mm thick axial CT images of the head were acquired  without IV contrast material.    FINDINGS:  There is moderate diffuse cerebral volume loss. There are  extensive confluent areas of decreased density in the cerebral white  matter bilaterally that are consistent with sequela of chronic small  vessel ischemic disease.     The ventricles and basal cisterns are within normal limits in  configuration given the degree of cerebral volume loss.  There is no  midline shift. There are no extra-axial fluid collections.     No intracranial hemorrhage, mass or recent infarct.    The visualized paranasal sinuses are well-aerated. There is no  mastoiditis. There are no fractures of the visualized bones.       Impression    IMPRESSION:  Diffuse cerebral volume loss and cerebral white matter  changes consistent with chronic small vessel ischemic disease. No  evidence for acute intracranial pathology.      Radiation dose for this scan was reduced using automated exposure  control, adjustment of the mA and/or kV according to patient size, or  iterative reconstruction technique.    GRACIA LÓPEZ MD          SYSTEM ID:  [id number]   XR Pelvis and Hip Left 1 View    Narrative    XR PELVIS AND HIP LEFT 1 VIEW 7/2/2021 3:47 PM     HISTORY: L hip pain      Impression    IMPRESSION: Left superior and inferior pubic ramus fractures. This  raises the possibility of additional, otherwise occult pelvic ring or  sacral injury. No evidence of left hip fracture or joint space  narrowing.    TIM NICOLE MD          SYSTEM ID:  [id number]

## 2021-07-03 NOTE — PLAN OF CARE
PT: Have received orders for physical therapy evaluation and treatment.  Patient with dementia, lives in [facility], admitted with left inferior and superior pubic rami fracture.  Currently awaiting ortho consult; will hold physical therapy assessment until after ortho consult.

## 2021-07-03 NOTE — UTILIZATION REVIEW
"  Admission Status; Secondary Review Determination     Admission Date: 7/2/2021  1:57 PM      Under the authority of the Utilization Management Committee, the utilization review process indicated a secondary review on the above patient.  The review outcome is based on review of the medical records, discussions with staff, and applying clinical experience noted on the date of the review.        (x)      Inpatient Status Appropriate - This patient's medical care is consistent with medical management for inpatient care and reasonable inpatient medical practice.      () Observation Status Appropriate - This patient does not meet hospital inpatient criteria and is placed in observation status. If this patient's primary payer is Medicare and was admitted as an inpatient, Condition Code 44 should be used and patient status changed to \"observation\".   () Admission Status NOT Appropriate - This patient's medical care is not consistent with medical management for Inpatient or Observation Status.          RATIONALE FOR DETERMINATION   Alejandra Paulino is a 88 year old female with a past medical history significant for Alzheimer's dementia, hypertension, and previous arrhythmia with pacemaker placement.  She presented to emergency room with left hip pain after a fall.  Found to have left pubic rami fractures.  She was placed in the hospital for further evaluation and symptom control.  She is going to be seen in consultation by orthopedic surgery and physical therapy.  She is expected to require a greater than 2 midnight stay at the hospital.  Due to this patient's advanced age, complex past medical history, left pubic rami fractures, and expectation of a greater than 2 midnight stay at the hospital, it is appropriate to have her admitted to the hospital as an inpatient.      The severity of illness, intensity of service provided, expected LOS and risk for adverse outcome make the care complex, high risk and appropriate for " hospital admission.        The information on this document is developed by the utilization review team in order for the business office to ensure compliance.  This only denotes the appropriateness of proper admission status and does not reflect the quality of care rendered.         The definitions of Inpatient Status and Observation Status used in making the determination above are those provided in the CMS Coverage Manual, Chapter 1 and Chapter 6, section 70.4.      Sincerely,     Ander Grimes D.O.  Utilization Review/ Case Management  [facility].

## 2021-07-03 NOTE — PROGRESS NOTES
"SPIRITUAL HEALTH SERVICES Progress Note  [facility]    Saw pt Alejandra per an admission request.  Alejandra has a history of dementia but was able to share aspects of her life.  She reported that she is Muslim and mentioned that she went to [place] \"[place].\"  Alejandra shared that she has children, and her nurse reported that her son visited earlier today.  Alejandra's lunch arrived during the conversation; she welcomed prayer before eating her lunch.    Plan: This author and other chaplains remain available per pt/family request.    Vladimir Be M.Div., [facility]  Staff   Phone [phone]    "

## 2021-07-04 ENCOUNTER — APPOINTMENT (OUTPATIENT)
Dept: PHYSICAL THERAPY | Facility: CLINIC | Age: 86
DRG: 536 | End: 2021-07-04
Attending: PHYSICIAN ASSISTANT
Payer: MEDICARE

## 2021-07-04 PROCEDURE — 250N000011 HC RX IP 250 OP 636: Performed by: PHYSICIAN ASSISTANT

## 2021-07-04 PROCEDURE — 97530 THERAPEUTIC ACTIVITIES: CPT | Mod: GP | Performed by: PHYSICAL THERAPIST

## 2021-07-04 PROCEDURE — 99207 PR CDG-MDM COMPONENT: MEETS LOW - DOWN CODED: CPT | Performed by: HOSPITALIST

## 2021-07-04 PROCEDURE — 97161 PT EVAL LOW COMPLEX 20 MIN: CPT | Mod: GP | Performed by: PHYSICAL THERAPIST

## 2021-07-04 PROCEDURE — 99232 SBSQ HOSP IP/OBS MODERATE 35: CPT | Performed by: HOSPITALIST

## 2021-07-04 PROCEDURE — 120N000001 HC R&B MED SURG/OB

## 2021-07-04 PROCEDURE — 250N000013 HC RX MED GY IP 250 OP 250 PS 637: Performed by: PHYSICIAN ASSISTANT

## 2021-07-04 PROCEDURE — 250N000013 HC RX MED GY IP 250 OP 250 PS 637: Performed by: INTERNAL MEDICINE

## 2021-07-04 RX ADMIN — OMEPRAZOLE 20 MG: 20 CAPSULE, DELAYED RELEASE ORAL at 09:19

## 2021-07-04 RX ADMIN — ACETAMINOPHEN 975 MG: 325 TABLET, FILM COATED ORAL at 13:16

## 2021-07-04 RX ADMIN — SERTRALINE HYDROCHLORIDE 50 MG: 50 TABLET ORAL at 21:49

## 2021-07-04 RX ADMIN — OXYBUTYNIN CHLORIDE 5 MG: 5 TABLET ORAL at 09:19

## 2021-07-04 RX ADMIN — ALPRAZOLAM 0.25 MG: 0.25 TABLET ORAL at 15:52

## 2021-07-04 RX ADMIN — ACETAMINOPHEN 975 MG: 325 TABLET, FILM COATED ORAL at 20:34

## 2021-07-04 RX ADMIN — OXYBUTYNIN CHLORIDE 5 MG: 5 TABLET ORAL at 20:34

## 2021-07-04 RX ADMIN — OXYBUTYNIN CHLORIDE 5 MG: 5 TABLET ORAL at 13:16

## 2021-07-04 RX ADMIN — QUETIAPINE FUMARATE 25 MG: 25 TABLET ORAL at 20:54

## 2021-07-04 RX ADMIN — ENOXAPARIN SODIUM 40 MG: 40 INJECTION SUBCUTANEOUS at 18:56

## 2021-07-04 RX ADMIN — QUETIAPINE FUMARATE 25 MG: 25 TABLET ORAL at 13:16

## 2021-07-04 RX ADMIN — DONEPEZIL HYDROCHLORIDE 10 MG: 10 TABLET ORAL at 21:49

## 2021-07-04 RX ADMIN — ACETAMINOPHEN 975 MG: 325 TABLET, FILM COATED ORAL at 09:18

## 2021-07-04 RX ADMIN — METOPROLOL TARTRATE 50 MG: 50 TABLET, FILM COATED ORAL at 09:35

## 2021-07-04 ASSESSMENT — ACTIVITIES OF DAILY LIVING (ADL)
ADLS_ACUITY_SCORE: 20
ADLS_ACUITY_SCORE: 17

## 2021-07-04 NOTE — PLAN OF CARE
Patient vital signs are at baseline: BP's were soft last evening and BP meds were held d/t parameters.   Patient able to ambulate as they were prior to admission or with assist devices provided by therapies during their stay:  No,  Reason:  Pt up with 2 assist, gait belt, and amy steady.   Patient MUST void prior to discharge:  Yes, voiding more adequately towards morning however continues to still retain. Bladder scanning for residuals.  Patient able to tolerate oral intake:  Yes  Pain has adequate pain control using Oral analgesics:  Yes, tylenol.    Pt is alert to self. LS clear, BS present, passing flatus. LBM 7/3/21. CMS intact but does c/o discomfort when getting up to and from the bathroom. Tylenol for the discomfort along with ice applications. Ortho consulted.

## 2021-07-04 NOTE — CONSULTS
[facility]    Orthopedics Consultation    Date of Admission:  7/2/2021    Assessment & Plan   Alejandra Paulino is a 88 year old female who was admitted on 7/2/2021. I was asked to see the patient for left hip pain.  She has left inferior and superior pubic rami fractures.  These are nonsurgical fractures.  She can be weightbearing as tolerated on the bilateral lower extremities.  She should be seen by her PT team for evaluation.  She will likely need TCU placement or if her memory care unit is able to accept her then she should return there.  She should follow-up in my clinic in approximately 2 to 3 weeks for repeat x-rays.  If she is unable to be transported to my clinic we can do portable x-rays at the 6-week stephen.  She can contact my care coordinator Tess at [phone].  Please contact our service if you have any questions or concerns.      Bartolo Brown MD    Code Status    No CPR- Do NOT Intubate    Reason for Consult   Reason for consult: Left hip pain    Primary Care Physician   [facility]    History of Present Illness   Alejandra Paulino is a 88 year old female who presents with a chief complaint of left hip pain.  The patient tripped over a curb and landed directly on the left hip.  She was unable to bear weight following the fall.  She was seen in our emergency department and was diagnosed with left superior and inferior pubic rami fractures.  She has a significant history of dementia.  She currently lives in memory care.  She denies any numbness or tingling distally.  She complains of pain in the left pelvis.    MEDS:   No current outpatient medications on file.       PAST MEDICAL HISTORY: No past medical history on file.    PAST SURGICAL HISTORY: No past surgical history on file.    FAMILY HISTORY: No family history on file.    SOCIAL HISTORY:   Social History     Tobacco Use     Smoking status: Not on file   Substance Use Topics     Alcohol use: Not  on file       ALLERGIES:  No Known Allergies    ROS:  10 point ROS neg other than the symptoms noted above in the HPI.    Physical Exam   Temp: 97.7  F (36.5  C) Temp src: Temporal BP: 104/43 Pulse: 91   Resp: 18 SpO2: 90 % O2 Device: None (Room air)    Vital Signs with Ranges  Temp:  [97  F (36.1  C)-97.7  F (36.5  C)] 97.7  F (36.5  C)  Pulse:  [69-98] 91  Resp:  [16-18] 18  BP: ()/(38-72) 104/43  SpO2:  [90 %-94 %] 90 %  170 lbs 6.24 oz    Constitutional: Pleasant, alert, appropriate, following commands.  HEENT: Head atraumatic normocephalic. Pupils equal round and reactive to light.  Respiratory: Unlabored breathing no audible wheeze  Cardiovascular: Regular rate and rhythm  GI: Abdomen soft nontender nondistended.  Lymph/Hematologic: No lymphadenopathy in areas examined  Genitourinary:  No tracy  Skin: No rashes, no cyanosis, no edema.  Musculoskeletal: Focused examination of the left lower extremity demonstrates sensation intact light touch in all nerve distributions.  She has no pain with logroll or knee flexion.  She does have pain with pelvic compression test.  She is able to flex and extend at the toes.  She has good capillary refill less than 2 seconds and a 2+ DP pulse.  Neurologic: Alert to person but not place or time.        Data   Pelvic x-rays demonstrate left superior and inferior pubic rami fractures.  There are no obvious fractures of the sacrum on these x-rays.

## 2021-07-04 NOTE — PLAN OF CARE
Patient vital signs are at baseline: Yes  Patient able to ambulate as they were prior to admission or with assist devices provided by therapies during their stay:  No,  Reason: up w/1 and amy steady and gb or A2 w/lift depending on how tired pt is  Patient MUST void prior to discharge:  Yes w/some urinary retention  Patient able to tolerate oral intake:  Yes  Pain has adequate pain control using Oral analgesics:  Yes, Tylenol and ice for pain    Alert to self only, sitter discontinued at 1030, discharge back to [facility] care if able to take back with assistance level.

## 2021-07-04 NOTE — PROVIDER NOTIFICATION
2012 - MD paged - Pt having lower BP's throughout the day. Recent BP's 104/72, HR 73. 106/41, HR 80. Has scheduled BP meds with no parameters. Please advise. Thank you!  2021 - Dr. Grimes placed orders via Epic.

## 2021-07-04 NOTE — PLAN OF CARE
RN Shift [id number]  Patient vital signs are at baseline: Yes  Patient able to ambulate as they were prior to admission or with assist devices provided by therapies during their stay:  No,  Reason:  Ax2 with lift. Attempted to stand with Yazmin Steady but pt refused and stated she was too weak. Up in chair for dinner.  Patient MUST void prior to discharge:  No,  Reason:  Straight-cathed for 580mL. Pt now DTV.   Patient able to tolerate oral intake:  Yes. Tolerated regular diet. Able to swallow pills okay.   Pain has adequate pain control using Oral analgesics:  Yes. Denies pain. Ice applied.      Confused and oriented to self only. Cooperative with staff but needing frequent re-orientation. PSC in room. CMS intact. TCU vs. Back to memory care.

## 2021-07-04 NOTE — PROGRESS NOTES
[facility]    Hospitalist Progress Note    Date of Service (when I saw the patient): 07/04/2021  Provider:  Evan Villanueva MD   Text Page  7am - 6PM       Assessment & Plan   Summary of Stay: Alejandra Paulino is a 88 year old female who is a memory care resident with a background history of Alzheimer's dementia, hypertension and prior pacemaker insertion admitted on 7/2/2021 with fall event     Problem List:   1. Intractable left hip pain  2. Left inferior and superior pubic rami fractures  3. Alzheimer's dementia  4. 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 Hypertension on metoprolol and lisinopril     Plan.  Inpatient cares.  Regular diet.  Bedside attendant.  Fall precautions  Orthopedics consultation appreciated  Pain control with APAP, as needed ibuprofen  Minimize narcotics if able.    Seroquel as needed for agitation.  Home medication: Xanax, Aricept, Zoloft resumed.  PT, social service evaluation.        DVT Prophylaxis: Enoxaparin (Lovenox) SQ  Code Status: No CPR- Do NOT Intubate    Disposition: Expected discharge LEEANNA in 1 - 2 days once .    Interval History   Patient severely demented, sitting in a chair, she has no recollection of accident/fall.  She denies any pain.  She is rejecting the breakfast because she believes it is dinner.  She is mumbling incoherently but not aggressive.    -Data reviewed today: I reviewed all new labs and imaging results over the last 24 hours.    Physical Exam   Temp: 97.7  F (36.5  C) Temp src: Temporal BP: 118/58 Pulse: 98   Resp: 18 SpO2: 90 % O2 Device: None (Room air)    Vitals:    07/02/21 1744   Weight: 77.3 kg (170 lb 6.2 oz)     Vital Signs with Ranges  Temp:  [97  F (36.1  C)-97.7  F (36.5  C)] 97.7  F (36.5  C)  Pulse:  [69-98] 98  Resp:  [16-18] 18  BP: ()/(38-72) 118/58  SpO2:  [90 %-94 %] 90 %  I/O last 3 completed shifts:  In: 1160 [P.O.:1160]  Out: 1475 [Urine:1475]    GEN:  Alert, not oriented x 3, appears comfortable, NAD.  HEENT:   Normocephalic/atraumatic, no scleral icterus, no nasal discharge, mouth moist.  CV:  Regular paced rhythm, no murmur or JVD.     LUNGS:  Clear to auscultation bilaterally without rales/rhonchi/wheezing/retractions.  Symmetric chest rise on inhalation noted.  ABD:  Active bowel sounds, soft, non-tender/non-distended.  No rebound/guarding/rigidity.  EXT:  No edema or cyanosis.  No joint synovitis noted.  SKIN:  Dry to touch, no exanthems noted in the visualized areas.       Medications       acetaminophen  975 mg Oral TID     ALPRAZolam  0.25 mg Oral Daily at 4 pm     donepezil  10 mg Oral At Bedtime     enoxaparin ANTICOAGULANT  40 mg Subcutaneous Q24H     [Held by provider] lisinopril  5 mg Oral BID     metoprolol tartrate  50 mg Oral BID     omeprazole  20 mg Oral QAM AC     oxybutynin  5 mg Oral TID     polyethylene glycol  17 g Oral Daily     sertraline  50 mg Oral At Bedtime     sodium chloride (PF)  3 mL Intracatheter Q8H       Data   Recent Labs   Lab 07/03/21  0627 07/02/21  1534   WBC 9.8 8.7   HGB 11.0* 11.7   MCV 97 94    206   * 130*   POTASSIUM 4.1 4.2   CHLORIDE 99 100   CO2 25 33*   BUN 20 14   CR 1.09* 0.88   ANIONGAP 7 <1*   EFREM 8.4* 8.6   * 109*       No results found for this or any previous visit (from the past 24 hour(s)).          Disclaimer: This note consists of symbols derived from keyboarding, dictation and/or voice recognition software. As a result, there may be errors in the script that have gone undetected. Please consider this when interpreting information found in this chart.

## 2021-07-04 NOTE — PLAN OF CARE
Patient vital signs are at baseline: Yes  Patient able to ambulate as they were prior to admission or with assist devices provided by therapies during their stay:  No,  Reason:  Using amy steady   Patient MUST void prior to discharge:  No,  Reason:  Pt is DTV   Patient able to tolerate oral intake:  Yes  Pain has adequate pain control using Oral analgesics:  Yes    Sitter at bedside. Oriented to self. Denies pain. Plan for discharge is back to memory care if they will take her vs TCU.

## 2021-07-04 NOTE — PROGRESS NOTES
ROSA ELENA spoke with staff at [facility] [phone] to discuss possible discharge later today.  Nurse will call back.     Addendum:  [facility] cannot accept patient back today 7/4 as they do not have a nurse on staff to assist with patient's return.

## 2021-07-05 LAB
CREAT SERPL-MCNC: 0.85 MG/DL (ref 0.52–1.04)
GFR SERPL CREATININE-BSD FRML MDRD: 61 ML/MIN/{1.73_M2}
OSMOLALITY SERPL: 287 MMOL/KG (ref 280–301)
OSMOLALITY UR: 404 MMOL/KG (ref 100–1200)
PLATELET # BLD AUTO: 141 10E9/L (ref 150–450)
SODIUM UR-SCNC: 24 MMOL/L

## 2021-07-05 PROCEDURE — 250N000011 HC RX IP 250 OP 636: Performed by: PHYSICIAN ASSISTANT

## 2021-07-05 PROCEDURE — 250N000013 HC RX MED GY IP 250 OP 250 PS 637: Performed by: PHYSICIAN ASSISTANT

## 2021-07-05 PROCEDURE — 85049 AUTOMATED PLATELET COUNT: CPT | Performed by: PHYSICIAN ASSISTANT

## 2021-07-05 PROCEDURE — 250N000013 HC RX MED GY IP 250 OP 250 PS 637: Performed by: INTERNAL MEDICINE

## 2021-07-05 PROCEDURE — 36415 COLL VENOUS BLD VENIPUNCTURE: CPT | Performed by: PHYSICIAN ASSISTANT

## 2021-07-05 PROCEDURE — 99232 SBSQ HOSP IP/OBS MODERATE 35: CPT | Performed by: HOSPITALIST

## 2021-07-05 PROCEDURE — 83930 ASSAY OF BLOOD OSMOLALITY: CPT | Performed by: PHYSICIAN ASSISTANT

## 2021-07-05 PROCEDURE — 83935 ASSAY OF URINE OSMOLALITY: CPT | Performed by: HOSPITALIST

## 2021-07-05 PROCEDURE — 99207 PR CDG-MDM COMPONENT: MEETS LOW - DOWN CODED: CPT | Performed by: HOSPITALIST

## 2021-07-05 PROCEDURE — 120N000001 HC R&B MED SURG/OB

## 2021-07-05 PROCEDURE — 82565 ASSAY OF CREATININE: CPT | Performed by: PHYSICIAN ASSISTANT

## 2021-07-05 PROCEDURE — 84300 ASSAY OF URINE SODIUM: CPT | Performed by: HOSPITALIST

## 2021-07-05 RX ADMIN — QUETIAPINE FUMARATE 25 MG: 25 TABLET ORAL at 20:56

## 2021-07-05 RX ADMIN — LORAZEPAM 0.5 MG: 2 INJECTION INTRAMUSCULAR; INTRAVENOUS at 23:18

## 2021-07-05 RX ADMIN — ACETAMINOPHEN 975 MG: 325 TABLET, FILM COATED ORAL at 09:09

## 2021-07-05 RX ADMIN — OXYBUTYNIN CHLORIDE 5 MG: 5 TABLET ORAL at 14:10

## 2021-07-05 RX ADMIN — OXYBUTYNIN CHLORIDE 5 MG: 5 TABLET ORAL at 09:09

## 2021-07-05 RX ADMIN — ALPRAZOLAM 0.25 MG: 0.25 TABLET ORAL at 16:04

## 2021-07-05 RX ADMIN — OXYBUTYNIN CHLORIDE 5 MG: 5 TABLET ORAL at 20:56

## 2021-07-05 RX ADMIN — DONEPEZIL HYDROCHLORIDE 10 MG: 10 TABLET ORAL at 20:55

## 2021-07-05 RX ADMIN — ENOXAPARIN SODIUM 40 MG: 40 INJECTION SUBCUTANEOUS at 20:57

## 2021-07-05 RX ADMIN — SERTRALINE HYDROCHLORIDE 50 MG: 50 TABLET ORAL at 20:56

## 2021-07-05 RX ADMIN — METOPROLOL TARTRATE 50 MG: 50 TABLET, FILM COATED ORAL at 09:09

## 2021-07-05 RX ADMIN — POLYETHYLENE GLYCOL 3350 17 G: 17 POWDER, FOR SOLUTION ORAL at 09:09

## 2021-07-05 RX ADMIN — ACETAMINOPHEN 975 MG: 325 TABLET, FILM COATED ORAL at 14:11

## 2021-07-05 RX ADMIN — METOPROLOL TARTRATE 50 MG: 50 TABLET, FILM COATED ORAL at 20:56

## 2021-07-05 RX ADMIN — ACETAMINOPHEN 975 MG: 325 TABLET, FILM COATED ORAL at 20:55

## 2021-07-05 RX ADMIN — OMEPRAZOLE 20 MG: 20 CAPSULE, DELAYED RELEASE ORAL at 09:09

## 2021-07-05 ASSESSMENT — ACTIVITIES OF DAILY LIVING (ADL)
ADLS_ACUITY_SCORE: 20
ADLS_ACUITY_SCORE: 22

## 2021-07-05 NOTE — PLAN OF CARE
7PM-7AM RN     Patient vital signs are at baseline: Yes.   Patient able to ambulate as they were prior to admission or with assist devices provided by therapies during their stay:  No. Lift.  Patient MUST void prior to discharge: Has, but retaining more than voiding.    Patient able to tolerate oral intake: Yes  Pain has adequate pain control using Oral analgesics: Yes.     Sitter present d/t dementia. Oriented to self only. Slept well overnight. Occasionally fidgets and pulls when up. Straight catheterized x1 for 600. Pure wick in place, but no output. Displays pain when up (lift to commode/chair), but denies when at rest. Scheduled Tylenol. Seroquel x1 at 2100 7/4. Plans to discharge to TCU or back to memory care if applicable.

## 2021-07-05 NOTE — PLAN OF CARE
Patient vital signs are at baseline: Yes  Patient able to ambulate as they were prior to admission or with assist devices provided by therapies during their stay:  No,  Reason:  up with assist of 2 with gait belt and Amy stedy  Patient MUST void prior to discharge:  Yes  Patient able to tolerate oral intake:  Yes  Pain has adequate pain control using Oral analgesics:  Yes    Up to recliner with gait belt via amy stedy  Has been sitter free since 9:30 am

## 2021-07-05 NOTE — CONSULTS
Care Management Initial Consult    General Information  Assessment completed with: Care Team Member, Melisa nurse at [facility]  Type of CM/SW Visit: Offer D/C Planning    Primary Care Provider verified and updated as needed: No   Readmission within the last 30 days:        Reason for Consult: care coordination/care conference, discharge planning            Communication Assessment  Patient's communication style: spoken language (English or Bilingual)    Hearing Difficulty or Deaf: no   Wear Glasses or Blind: yes    Cognitive  Cognitive/Neuro/Behavioral: .WDL except  Level of Consciousness: confused  Arousal Level: opens eyes spontaneously  Orientation: disoriented to, place, time, situation  Mood/Behavior: calm, cooperative  Best Language: 0 - No aphasia  Speech: rambling, illogical    Living Environment:   People in home: facility resident     Current living Arrangements: assisted living(Memory Care)  Name of Facility: [facility]   Able to return to prior arrangements: yes       Family/Social Support:  Care provided by: other (see comments)  Provides care for: no one, unable/limited ability to care for self  Marital Status:   Children            Current Resources:   Patient receiving home care services: No     Community Resources: None  Equipment currently used at home: none       Lifestyle & Psychosocial Needs:        Socioeconomic History     Marital status:      Spouse name: Not on file     Number of children: Not on file     Years of education: Not on file     Highest education level: Not on file          Functional Status:  Prior to admission patient needed assistance:   Dependent ADLs:: Ambulation-no assistive device  Dependent IADLs:: Medication Management, Transportation, Meal Preparation, Laundry, Cooking, Cleaning       Care Management Follow Up    Length of Stay (days): 3    Expected Discharge Date: 07/05/21     Concerns to be Addressed: care coordination/care conferences, discharge  planning     Patient plan of care discussed at interdisciplinary rounds: Yes    Anticipated Discharge Disposition: Assisted Living, Home Care     Anticipated Discharge Services: None  Anticipated Discharge DME: Wheelchair, Walker    Referrals Placed by CM/SW: External Care Coordination, Homecare  Private pay costs discussed: Not applicable    Additional Information:  Spoke with nurse Melisa at [facility], [phone]. Discussed therapy recommendations of return to memory care if able to accommodate assistance and provide a lift and assist of 1-2 for transfers. Home Care PT also recommended.  typically uses [facility].   Patient will need an order for a wheelchair and potentially a walker upon discharge.   If  unable to provide assistance, TCU is recommended.  Prior to hospitalization, patient was independent, no requiring any DME for mobility.   [facility] prefers a return time prior to 1500 on day of discharge. Medications to be filled through [facility] and faxed to [facility] at [phone].     Nursing at [facility] is discussing patient care needs with director. Nursing will call CM back to further discuss discharge planning and needs. Nursing at [facility] clarifying if patient needs to remain without a 1:1 attendant prior to return to memory care.     Update 1205: Received voice message from michaela Vincent at [facility]. PT note faxed to [phone]. Verified that patient will need to be free of 1:1 Attendant for 24 hours prior to returning to memory care. Updated by hospital nursing that attendant was discontinued at 0930 today 7/5.       Darlene Mercado, RN      Darlene Mercado RN Case Manager  Inpatient Care Coordination  [facility]   [phone]

## 2021-07-05 NOTE — PROGRESS NOTES
[facility]    Hospitalist Progress Note    Date of Service (when I saw the patient): 07/05/2021  Provider:  Evan Villanueva MD   Text Page  7am - 6PM       Assessment & Plan   Summary of Stay: Alejandra Paulino is a 88 year old female who is a memory care resident with a background history of Alzheimer's dementia, hypertension and prior pacemaker insertion admitted on 7/2/2021 with fall event     Problem List:   1. Intractable left hip pain  2. Left inferior and superior pubic rami fractures  3. Alzheimer's dementia  4. 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 Hypertension on metoprolol and lisinopril  6. Mild hyponatremia, unclear etiology at this  moment     Plan.  Inpatient cares.  Regular diet.  Bedside attendant.  Fall precautions  Orthopedics consultation appreciated  Pain control with APAP, as needed ibuprofen  Minimize narcotics if able.    Seroquel as needed for agitation.  Home medication: Xanax, Aricept, Zoloft resumed.  PT, social service evaluation.        DVT Prophylaxis: Enoxaparin (Lovenox) SQ  Code Status: No CPR- Do NOT Intubate    Disposition: Expected discharge [facility] in 24 hours.    Interval History   Patient severely demented. In bed. Pleasant and joking.  She denies any pain.  Daughter is visiting, she tells me that the patient is at her baseline now.  She will not be back to the [facility] because they request at least 24 hours without bedside attendant.      -Data reviewed today: I reviewed all new labs and imaging results over the last 24 hours.    Physical Exam   Temp: 97.3  F (36.3  C) Temp src: Temporal BP: 128/49 Pulse: 68   Resp: 20 SpO2: 92 % O2 Device: None (Room air)    Vitals:    07/02/21 1744   Weight: 77.3 kg (170 lb 6.2 oz)     Vital Signs with Ranges  Temp:  [95.6  F (35.3  C)-97.3  F (36.3  C)] 97.3  F (36.3  C)  Pulse:  [68-87] 68  Resp:  [16-20] 20  BP: (101-128)/(44-55) 128/49  SpO2:  [91 %-93 %] 92 %  I/O last 3 completed shifts:  In: 270 [P.O.:270]  Out: 1180  [Urine:1180]    GEN:  Alert, not oriented x 3, appears comfortable, NAD.  HEENT:  Normocephalic/atraumatic, no scleral icterus, no nasal discharge, mouth moist.  CV:  Regular paced rhythm, no murmur or JVD.     LUNGS:  Clear to auscultation bilaterally without rales/rhonchi/wheezing/retractions.  Symmetric chest rise on inhalation noted.  ABD:  Active bowel sounds, soft, non-tender/non-distended.  No rebound/guarding/rigidity.  EXT:  No edema or cyanosis.  No joint synovitis noted.  SKIN:  Dry to touch, no exanthems noted in the visualized areas.       Medications       acetaminophen  975 mg Oral TID     ALPRAZolam  0.25 mg Oral Daily at 4 pm     donepezil  10 mg Oral At Bedtime     enoxaparin ANTICOAGULANT  40 mg Subcutaneous Q24H     [Held by provider] lisinopril  5 mg Oral BID     metoprolol tartrate  50 mg Oral BID     omeprazole  20 mg Oral QAM AC     oxybutynin  5 mg Oral TID     polyethylene glycol  17 g Oral Daily     sertraline  50 mg Oral At Bedtime     sodium chloride (PF)  3 mL Intracatheter Q8H       Data   Recent Labs   Lab 07/05/21  0716 07/03/21  0627 07/02/21  1534   WBC  --  9.8 8.7   HGB  --  11.0* 11.7   MCV  --  97 94   * 154 206   NA  --  131* 130*   POTASSIUM  --  4.1 4.2   CHLORIDE  --  99 100   CO2  --  25 33*   BUN  --  20 14   CR 0.85 1.09* 0.88   ANIONGAP  --  7 <1*   EFREM  --  8.4* 8.6   GLC  --  103* 109*       No results found for this or any previous visit (from the past 24 hour(s)).          Disclaimer: This note consists of symbols derived from keyboarding, dictation and/or voice recognition software. As a result, there may be errors in the script that have gone undetected. Please consider this when interpreting information found in this chart.

## 2021-07-06 VITALS
RESPIRATION RATE: 18 BRPM | SYSTOLIC BLOOD PRESSURE: 115 MMHG | BODY MASS INDEX: 28.46 KG/M2 | HEIGHT: 64 IN | HEART RATE: 75 BPM | DIASTOLIC BLOOD PRESSURE: 42 MMHG | TEMPERATURE: 98.1 F | WEIGHT: 166.7 LBS | OXYGEN SATURATION: 91 %

## 2021-07-06 LAB
ANION GAP SERPL CALCULATED.3IONS-SCNC: 5 MMOL/L (ref 3–14)
BUN SERPL-MCNC: 20 MG/DL (ref 7–30)
CALCIUM SERPL-MCNC: 8.2 MG/DL (ref 8.5–10.1)
CHLORIDE SERPL-SCNC: 105 MMOL/L (ref 94–109)
CO2 SERPL-SCNC: 26 MMOL/L (ref 20–32)
CREAT SERPL-MCNC: 0.79 MG/DL (ref 0.52–1.04)
GFR SERPL CREATININE-BSD FRML MDRD: 67 ML/MIN/{1.73_M2}
GLUCOSE SERPL-MCNC: 115 MG/DL (ref 70–99)
POTASSIUM SERPL-SCNC: 4.5 MMOL/L (ref 3.4–5.3)
SODIUM SERPL-SCNC: 133 MMOL/L (ref 133–144)

## 2021-07-06 PROCEDURE — 250N000013 HC RX MED GY IP 250 OP 250 PS 637: Performed by: PHYSICIAN ASSISTANT

## 2021-07-06 PROCEDURE — 250N000013 HC RX MED GY IP 250 OP 250 PS 637: Performed by: INTERNAL MEDICINE

## 2021-07-06 PROCEDURE — 99239 HOSP IP/OBS DSCHRG MGMT >30: CPT | Performed by: HOSPITALIST

## 2021-07-06 PROCEDURE — 36415 COLL VENOUS BLD VENIPUNCTURE: CPT | Performed by: HOSPITALIST

## 2021-07-06 PROCEDURE — 80048 BASIC METABOLIC PNL TOTAL CA: CPT | Performed by: HOSPITALIST

## 2021-07-06 RX ORDER — ACETAMINOPHEN 500 MG
1000 TABLET ORAL EVERY 6 HOURS PRN
DISCHARGE
Start: 2021-07-06

## 2021-07-06 RX ORDER — ALPRAZOLAM 0.25 MG/1
0.25 TABLET, ORALLY DISINTEGRATING ORAL
Qty: 4 TABLET | Refills: 0 | Status: SHIPPED | OUTPATIENT
Start: 2021-07-06 | End: 2021-07-11

## 2021-07-06 RX ADMIN — OXYCODONE HYDROCHLORIDE 5 MG: 5 TABLET ORAL at 04:15

## 2021-07-06 RX ADMIN — OMEPRAZOLE 20 MG: 20 CAPSULE, DELAYED RELEASE ORAL at 09:46

## 2021-07-06 RX ADMIN — POLYETHYLENE GLYCOL 3350 17 G: 17 POWDER, FOR SOLUTION ORAL at 09:47

## 2021-07-06 RX ADMIN — ACETAMINOPHEN 975 MG: 325 TABLET, FILM COATED ORAL at 16:58

## 2021-07-06 RX ADMIN — METOPROLOL TARTRATE 50 MG: 50 TABLET, FILM COATED ORAL at 09:47

## 2021-07-06 RX ADMIN — ACETAMINOPHEN 975 MG: 325 TABLET, FILM COATED ORAL at 09:46

## 2021-07-06 RX ADMIN — OXYBUTYNIN CHLORIDE 5 MG: 5 TABLET ORAL at 09:46

## 2021-07-06 RX ADMIN — OXYBUTYNIN CHLORIDE 5 MG: 5 TABLET ORAL at 16:59

## 2021-07-06 RX ADMIN — ALPRAZOLAM 0.25 MG: 0.25 TABLET ORAL at 16:59

## 2021-07-06 ASSESSMENT — ACTIVITIES OF DAILY LIVING (ADL)
ADLS_ACUITY_SCORE: 20
ADLS_ACUITY_SCORE: 22
ADLS_ACUITY_SCORE: 20
ADLS_ACUITY_SCORE: 22
ADLS_ACUITY_SCORE: 20
ADLS_ACUITY_SCORE: 22

## 2021-07-06 ASSESSMENT — MIFFLIN-ST. JEOR: SCORE: 1171.15

## 2021-07-06 NOTE — PLAN OF CARE
Pt up with Ax2 with leni delgado. Alert to self. Has a pacemaker. Oxy 5mg PO given for pain. Urine is very odorous. Pt did not sleep at all last night. Possible discharge today back to memory care. VSS.

## 2021-07-06 NOTE — PROGRESS NOTES
Care Management Discharge Note    Discharge Date: 07/06/21       Discharge Disposition: Transitional Care    Discharge Services: Transportation Services    Discharge DME: Wheelchair, Walker    Discharge Transportation: health plan transportation    Private pay costs discussed: transportation costs    PAS Confirmation Code: [id number]  Patient/family educated on Medicare website which has current facility and service quality ratings: yes    Education Provided on the Discharge Plan:    Persons Notified of Discharge Plans: family  Patient/Family in Agreement with the Plan:      Handoff Referral Completed: No    Additional Information:  Pt accepted to [facility] for today. Family updated ok with the TCU placement. PAS completed. Your information has been submitted on July 06th, 2021 at 02:46:36 PM CDT. The confirmation number is [id number]  [facility] Transport scheduled for 1900 this evening. Orders have been faxed. Family to bring change of clothes this afternoon.      Shalonda Kolb RN, BSN CTS  Care Coordinator  [facility]   [phone]

## 2021-07-06 NOTE — PROGRESS NOTES
Care Management Discharge Note    Discharge Date: 07/06/21       Discharge Disposition: Assisted Living, Home Care    Discharge Services: None    Discharge DME: Wheelchair, Walker    Discharge Transportation: agency    Private pay costs discussed: transportation costs    PAS Confirmation Code:    Patient/family educated on Medicare website which has current facility and service quality ratings:      Education Provided on the Discharge Plan:    Persons Notified of Discharge Plans: pts sonSergey and [facility] Fiona Fuentes   Patient/Family in Agreement with the Plan:      Handoff Referral Completed: No    Additional Information:  Pt medically ready for discharge. MD inquiring about Lovenox and if pts [facility] facility able to accommodate that, they are not so MD will write for an oral option. CM spoke with Fiona Fuentes at [facility] [phone], did notify her that pt is currently assist of 2 to the bedside commode, she states they are able to accommodate that level of care. Request the MD order home care PT/OT as well. Dr. Villanueva web paged for discharge orders with home care PT/OT. Family requests medical transport. [facility] requests stretcher transport with pts dementia for safety. Pt's son, Sergey who was in pts room has been informed of all this. He will call his sister, Darlene whom I was unable to get a hold of.   Will fax orders asap to [place] at [phone] when completed.        Shalonda Kolb RN, BSN CTS  Care Coordinator  [facility]   [phone] 1230 update: family concerned about pt going back to her memory care facility and would like her to go to a TCU for more therapies. Transport has been cancelled MD webpaged. Waiting for daughterDarlene to callback for TCU choices.    Update 1311: Spoke with pts daughterDarlene. She is concerned about her mother returning back to [facility] and her falling out of bed, they do not have side rails on their beds. Family requesting TCU  referrals be sent. Pt/family was provided with the Medicare Compare list for SNF.  Discussed associated Medicare star ratings to assist with choice for referrals/discharge planning Yes    Education was given to pt/family that star ratings are updated/maintained by Medicare and can be reviewed by visiting www.medicare.gov Yes    REferrals sent to:  1. [facility]  2. [facility]  3. [facility].    Will continue to follow with discharge planning.    Shalonda Kolb RN, BSN CTS  Care Coordinator  [facility]   [phone]    Update 1330: [facility] can accept pt today. CM tried to call pts daughter goes right to voicemail. Called pts son, Sergey updated on SVA he is going to his sisters house to discuss the options and will call back with their decisions.

## 2021-07-06 NOTE — PLAN OF CARE
Pt oriented to self only, up with Ax2, amy steady to BSC.  Vitals monitored on RA.  Pain managed with scheduled tylenol.  IV locked.  Regular diet.  Son present in room.  Pt to discharge this evening to TCU at 1900.  Son to bring clothing before discharge.

## 2021-07-06 NOTE — DISCHARGE SUMMARY
[facility]    Discharge Summary  Hospitalist    Date of Admission:  7/2/2021  Date of Discharge:  7/6/2021  Provider:  Evan Villanueva MD  Date of Service (when I last saw the patient): 07/06/21    Discharge Diagnoses   1. Intractable left hip pain  2. Left inferior and superior pubic rami fractures  3. Alzheimer's dementia  4. 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 Hypertension on metoprolol and lisinopril  6. Mild transient hyponatremia. Corrected. Unclear etiology.    Nutritional status   Other medical issues:  No past medical history on file.    History of Present Illness   Alejandra Paulino is an 88 year old female who presented with pelvic pain after fall.  Please see the admission history and physical for full details.    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 Corrected. Unclear etiology.         # Discharge Pain Plan:    - Patient currently has NO PAIN and is not being prescribed pain medications on discharge.      Significant Results and Procedures   See below     Pending Results      Unresulted Labs Ordered in the Past 30 Days of this Admission     No orders found from 6/2/2021 to 7/3/2021.          Code Status   DNR / DNI       Primary Care Physician   [facility]    GEN:  Alert, not oriented x 3, appears comfortable, NAD.  HEENT:  Normocephalic/atraumatic, no scleral icterus, no nasal discharge, mouth moist.  CV:  Regular paced rhythm, no murmur or JVD.     LUNGS:  Clear to auscultation bilaterally without rales/rhonchi/wheezing/retractions.  Symmetric chest rise  on inhalation noted.  ABD:  Active bowel sounds, soft, non-tender/non-distended.  No rebound/guarding/rigidity.  EXT:  No edema or cyanosis.  No joint synovitis noted.  SKIN:  Dry to touch, no exanthems noted in the visualized areas.     Discharge Disposition   [facility]charged to [facility]    Consultations This Hospital Stay   ORTHOPEDIC SURGERY IP CONSULT  CARE MANAGEMENT / SOCIAL WORK IP CONSULT  PHYSICAL THERAPY ADULT IP CONSULT  CARE MANAGEMENT / SOCIAL WORK IP CONSULT    Time Spent on this Encounter   I, Evan Villanueva MD, personally saw the patient today and spent greater than 30 minutes discharging this patient.     Discharge Orders      Weight bearing status    WBAT     Follow Up and recommended labs and tests    She should follow-up in Dr. Brown's clinic in approximately 2 to 3 weeks for repeat x-rays.  If she is unable to be transported to the clinic portable x-rays can be done at the 6-week stephen.  Contact Dr. Brown's care coordinator Tess at [phone] for appointment arrangement.     Discharge Medications   Current Discharge Medication List      CONTINUE these medications which have NOT CHANGED    Details   !! acetaminophen (TYLENOL) 500 MG tablet Take 1,000 mg by mouth 2 times daily      !! acetaminophen (TYLENOL) 500 MG tablet Take 1,000 mg by mouth daily as needed for mild pain      ALPRAZolam (XANAX) 0.25 MG tablet Take 0.25 mg by mouth daily at 4pm      Calcium Carbonate (CALCIUM-CARB 600 PO) Take 1 tablet by mouth 2 times daily      donepezil (ARICEPT) 10 MG tablet Take 10 mg by mouth At Bedtime      glucosamine-chondroitin 500-400 MG CAPS per capsule Take 1 capsule by mouth daily      lisinopril (ZESTRIL) 5 MG tablet Take 5 mg by mouth 2 times daily      metoprolol tartrate (LOPRESSOR) 50 MG tablet Take 50 mg by mouth 2 times daily      multivitamin, therapeutic (THERA-VIT) TABS tablet Take 1 tablet by mouth daily      oxybutynin (DITROPAN) 5 MG tablet Take 5 mg by mouth 3 times daily       sertraline (ZOLOFT) 50 MG tablet Take 50 mg by mouth At Bedtime       !! - Potential duplicate medications found. Please discuss with provider.        Allergies   No Known Allergies     Data   Most Recent 3 CBC's:  Recent Labs   Lab Test 07/05/21  0716 07/03/21 0627 07/02/21  1534   WBC  --  9.8 8.7   HGB  --  11.0* 11.7   MCV  --  97 94   * 154 206      Most Recent 3 BMP's:  Recent Labs   Lab Test 07/06/21  0909 07/05/21  0716 07/03/21 0627 07/02/21  1534     --  131* 130*   POTASSIUM 4.5  --  4.1 4.2   CHLORIDE 105  --  99 100   CO2 26  --  25 33*   BUN 20  --  20 14   CR 0.79 0.85 1.09* 0.88   ANIONGAP 5  --  7 <1*   EFREM 8.2*  --  8.4* 8.6   *  --  103* 109*     Most Recent 2 LFT's:No lab results found.  Most Recent INR's and Anticoagulation Dosing History:  Anticoagulation Dose History     There is no flowsheet data to display.        Most Recent 3 Troponin's:No lab results found.  Most Recent Cholesterol Panel:No lab results found.  Most Recent 6 Bacteria Isolates From Any Culture (See EPIC Reports for Culture Details):No lab results found.  Most Recent TSH, T4 and A1c Labs:No lab results found.  Results for orders placed or performed during the hospital encounter of 07/02/21   CT Head w/o Contrast    Narrative    CT OF THE HEAD WITHOUT CONTRAST 7/2/2021 3:04 PM     COMPARISON: None    HISTORY: Head injury.    TECHNIQUE: 5 mm thick axial CT images of the head were acquired  without IV contrast material.    FINDINGS:  There is moderate diffuse cerebral volume loss. There are  extensive confluent areas of decreased density in the cerebral white  matter bilaterally that are consistent with sequela of chronic small  vessel ischemic disease.     The ventricles and basal cisterns are within normal limits in  configuration given the degree of cerebral volume loss.  There is no  midline shift. There are no extra-axial fluid collections.     No intracranial hemorrhage, mass or recent  infarct.    The visualized paranasal sinuses are well-aerated. There is no  mastoiditis. There are no fractures of the visualized bones.       Impression    IMPRESSION:  Diffuse cerebral volume loss and cerebral white matter  changes consistent with chronic small vessel ischemic disease. No  evidence for acute intracranial pathology.      Radiation dose for this scan was reduced using automated exposure  control, adjustment of the mA and/or kV according to patient size, or  iterative reconstruction technique.    GRACIA LÓPEZ MD          SYSTEM ID:  [id number]   XR Pelvis and Hip Left 1 View    Narrative    XR PELVIS AND HIP LEFT 1 VIEW 7/2/2021 3:47 PM     HISTORY: L hip pain      Impression    IMPRESSION: Left superior and inferior pubic ramus fractures. This  raises the possibility of additional, otherwise occult pelvic ring or  sacral injury. No evidence of left hip fracture or joint space  narrowing.    TIM NICOLE MD          SYSTEM ID:  [id number]     Disclaimer: This note consists of symbols derived from keyboarding, dictation and/or voice recognition software. As a result, there may be errors in the script that have gone undetected. Please consider this when interpreting information found in this chart.

## 2021-07-07 ENCOUNTER — NURSING HOME VISIT (OUTPATIENT)
Dept: GERIATRICS | Facility: CLINIC | Age: 86
End: 2021-07-07
Payer: MEDICARE

## 2021-07-07 VITALS
OXYGEN SATURATION: 92 % | TEMPERATURE: 97.5 F | HEART RATE: 107 BPM | WEIGHT: 170 LBS | SYSTOLIC BLOOD PRESSURE: 136 MMHG | DIASTOLIC BLOOD PRESSURE: 57 MMHG | HEIGHT: 64 IN | BODY MASS INDEX: 29.02 KG/M2 | RESPIRATION RATE: 22 BRPM

## 2021-07-07 DIAGNOSIS — I10 ESSENTIAL HYPERTENSION: ICD-10-CM

## 2021-07-07 DIAGNOSIS — Z95.0 PACEMAKER: ICD-10-CM

## 2021-07-07 DIAGNOSIS — M25.552 LEFT HIP PAIN: Primary | ICD-10-CM

## 2021-07-07 DIAGNOSIS — G30.9 ALZHEIMER'S DEMENTIA WITH BEHAVIORAL DISTURBANCE, UNSPECIFIED TIMING OF DEMENTIA ONSET: ICD-10-CM

## 2021-07-07 DIAGNOSIS — S32.592D CLOSED FRACTURE OF MULTIPLE RAMI OF LEFT PUBIS WITH ROUTINE HEALING, SUBSEQUENT ENCOUNTER: ICD-10-CM

## 2021-07-07 DIAGNOSIS — F02.81 ALZHEIMER'S DEMENTIA WITH BEHAVIORAL DISTURBANCE, UNSPECIFIED TIMING OF DEMENTIA ONSET: ICD-10-CM

## 2021-07-07 PROCEDURE — 99309 SBSQ NF CARE MODERATE MDM 30: CPT | Performed by: NURSE PRACTITIONER

## 2021-07-07 ASSESSMENT — MIFFLIN-ST. JEOR: SCORE: 1186.11

## 2021-07-07 NOTE — PLAN OF CARE
Pt son present at the beginning of the shift around 1530 and was aware that the patient was going to discharge to the rehab at 1900 today. IV removed. Pt packed and was dressed in her own clothes prior to leaving. Pt had on one ring and her glasses, clothing and shoes when discharged. AVS copy was given to pts son prior to discharge and reviewed with him by this staff. Pt assisted to her stretcher using the yellow slip  caitie. Report given to transport staff and belongings sent with her.

## 2021-07-07 NOTE — PLAN OF CARE
"Physical Therapy Discharge Summary     Reason for therapy discharge:    Discharged to transitional care facility.     Progress towards therapy goal(s). See goals on Care Plan in [facility] electronic health record for goal details.  Goals not met.  Barriers to achieving goals:   limited tolerance for therapy.     Therapy recommendation(s):    Continued therapy is recommended.  Rationale/Recommendations:  Per last treating therapist's note \"Patient requires lift equipment for transfers at this time, modA bed mobility. If [facility] can assist patient with all mobility, would benefit from returning back to environment with HHPT services. If not patient would benefit from continued skilled PT services in TCU setting\"  "

## 2021-07-07 NOTE — PROGRESS NOTES
"[place] GERIATRIC SERVICES  PRIMARY CARE PROVIDER AND CLINIC:  [facility], [address] [place] [address], [address] [address]  Chief Complaint   Patient presents with     Hospital F/U     [facility] Medical Record Number:  [id number]  Place of Service where encounter took place:  [facility] ([facility]) [[id number]]    Alejandra Paulino  is a 88 year old  (2/27/1933), admitted to the above facility from  [facility]. Hospital stay 7/2/21 through 7/6/21..  Admitted to this facility for  rehab, medical management and nursing care.    HPI:    HPI information obtained from: facility chart records, facility staff, patient report and [facility] chart review.   Brief Summary of Hospital Course:     88 y.o female admitted to U for acute rehab and medical management after hospitalization as above for pelvic pain after a fall. Imaging revealed left inferior and superior pubic rami fractures. TCU stay recommended at hospital discharge. PMH reviewed      Updates on Status Since Skilled nursing Admission:     Per nursing patient impulsive and has had two falls in TCU since admission- both without injury.    Patient found in room with daughter Yazmin. Patient is alert, calm, NAD. Mood is bright and laughs a lot. Some word finding and word salad and confusion regarding current situation. But tells stories well and responds appropriately regarding past memories. Patient denies pain currently. Reports history of chronic low back pain. Yazmin adds that patient does have pain to left hip when weight bearing. Goal is to do some rehab and return to memory care. Yazmin reports patient can be impulsive and anxious and has been on the Xanax for some time and \"needs it\".   VS reviewed    CODE STATUS/ADVANCE DIRECTIVES DISCUSSION:   DNR / DNI  Patient's living condition: lives in an assisted living facility- memory care  ALLERGIES: Patient has no known allergies.  PAST MEDICAL HISTORY:  has no past medical history " "on file.  PAST SURGICAL HISTORY:   has no past surgical history on file.  FAMILY HISTORY: family history is not on file.  SOCIAL HISTORY:       Post Discharge Medication Reconciliation Status: discharge medications reconciled and changed, per note/orders    Current Outpatient Medications   Medication Sig Dispense Refill     acetaminophen (TYLENOL) 500 MG tablet Take 2 tablets (1,000 mg) by mouth every 6 hours as needed for pain       acetaminophen (TYLENOL) 500 MG tablet Take 1,000 mg by mouth 3 times daily        Calcium Carbonate (CALCIUM-CARB 600 PO) Take 1 tablet by mouth 2 times daily       donepezil (ARICEPT) 10 MG tablet Take 10 mg by mouth At Bedtime       glucosamine-chondroitin 500-400 MG CAPS per capsule Take 1 capsule by mouth daily       lisinopril (ZESTRIL) 5 MG tablet Take 5 mg by mouth 2 times daily       metoprolol tartrate (LOPRESSOR) 50 MG tablet Take 50 mg by mouth 2 times daily       multivitamin, therapeutic (THERA-VIT) TABS tablet Take 1 tablet by mouth daily       oxybutynin (DITROPAN) 5 MG tablet Take 5 mg by mouth 3 times daily       sertraline (ZOLOFT) 50 MG tablet Take 50 mg by mouth At Bedtime             ROS:  Limited secondary to cognitive impairment but today pt reports as above    Vitals:  /57   Pulse 107   Temp 97.5  F (36.4  C)   Resp 22   Ht 1.626 m (5' 4\")   Wt 77.1 kg (170 lb)   SpO2 92%   BMI 29.18 kg/m    Exam:    GENERAL APPEARANCE: Alert, in no distress   ENT: Mouth and posterior oropharynx normal, moist mucous membranes   EYES: EOM, conjunctivae, lids, pupils and irises normal   NECK: No adenopathy,masses or thyromegaly   RESP: respiratory effort and palpation of chest normal, Lungs clear to auscultation  CV: VS as above,  peripheral edema - no edema  ABDOMEN: normal bowel sounds, soft, nontender, no hepatosplenomegaly or other masses   : palpation of bladder WNL   M/S: Gait and station normal   Digits and nails normal - SAMPSON  SKIN: Inspection of skin and " subcutaneous tissue baseline, Palpation of skin and subcutaneous tissue baseline, bruising inside left thigh  NEURO: Cranial nerves 2-12 are normal tested and grossly at patient's baseline, Examination of sensation by touch normal   PSYCH: oriented X self- , affect and mood normal           Lab/Diagnostic data:  Recent labs in [facility] reviewed by me today.     ASSESSMENT/PLAN:  Left hip pain  Fracture of multiple pubic rami (H)  - after fall  - schedule some acetaminophen 2/2 pain when up working with rehab  - PT/OT    Alzheimer's dementia (H)  - impulsive, two falls in TCU  - resides in memory care and plan is to return   - continues on Aricept, prn Xanax- culprit med- consider discontinuation -   - supportive cares and treatments, anticipate needs, toilet program, safety checks    Pacemaker  - VS per unit protocol   pacer checks as directed    Hypertension  - controlled  - continue on lisinopril and metoprolol- with parameters to avoid hypotension  - VS per unit protocol     - orders written on the unit        Electronically signed by:  JAYDON Luis CNP

## 2021-07-08 ENCOUNTER — RECORDS - HEALTHEAST (OUTPATIENT)
Dept: LAB | Facility: CLINIC | Age: 86
End: 2021-07-08

## 2021-07-08 ASSESSMENT — MIFFLIN-ST. JEOR: SCORE: 1164.34

## 2021-07-09 ENCOUNTER — NURSING HOME VISIT (OUTPATIENT)
Dept: GERIATRICS | Facility: CLINIC | Age: 86
End: 2021-07-09
Payer: MEDICARE

## 2021-07-09 ENCOUNTER — TELEPHONE (OUTPATIENT)
Dept: GERIATRICS | Facility: CLINIC | Age: 86
End: 2021-07-09

## 2021-07-09 VITALS
BODY MASS INDEX: 28.2 KG/M2 | OXYGEN SATURATION: 100 % | RESPIRATION RATE: 18 BRPM | SYSTOLIC BLOOD PRESSURE: 112 MMHG | HEIGHT: 64 IN | TEMPERATURE: 96.9 F | HEART RATE: 75 BPM | DIASTOLIC BLOOD PRESSURE: 64 MMHG | WEIGHT: 165.2 LBS

## 2021-07-09 DIAGNOSIS — R53.81 PHYSICAL DECONDITIONING: ICD-10-CM

## 2021-07-09 DIAGNOSIS — N32.81 OVERACTIVE BLADDER: ICD-10-CM

## 2021-07-09 DIAGNOSIS — S32.9XXD CLOSED NONDISPLACED FRACTURE OF PELVIS WITH ROUTINE HEALING, UNSPECIFIED PART OF PELVIS, SUBSEQUENT ENCOUNTER: Primary | ICD-10-CM

## 2021-07-09 DIAGNOSIS — I10 ESSENTIAL HYPERTENSION: ICD-10-CM

## 2021-07-09 DIAGNOSIS — F03.90 DEMENTIA WITHOUT BEHAVIORAL DISTURBANCE, UNSPECIFIED DEMENTIA TYPE: ICD-10-CM
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
## 2021-07-09 LAB
ANION GAP SERPL CALCULATED.3IONS-SCNC: 10 MMOL/L (ref 5–18)
BUN SERPL-MCNC: 25 MG/DL (ref 8–28)
CALCIUM SERPL-MCNC: 9 MG/DL (ref 8.5–10.5)
CHLORIDE BLD-SCNC: 101 MMOL/L (ref 98–107)
CO2 SERPL-SCNC: 25 MMOL/L (ref 22–31)
CREAT SERPL-MCNC: 0.81 MG/DL (ref 0.6–1.1)
ERYTHROCYTE [DISTWIDTH] IN BLOOD BY AUTOMATED COUNT: 14.1 % (ref 11–14.5)
GFR SERPL CREATININE-BSD FRML MDRD: >60 ML/MIN/1.73M2
GLUCOSE BLD-MCNC: 96 MG/DL (ref 70–125)
HCT VFR BLD AUTO: 28.4 % (ref 35–47)
HGB BLD-MCNC: 9.2 G/DL (ref 12–16)
MCH RBC QN AUTO: 30.8 PG (ref 27–34)
MCHC RBC AUTO-ENTMCNC: 32.4 G/DL (ref 32–36)
MCV RBC AUTO: 95 FL (ref 80–100)
PLATELET # BLD AUTO: 260 THOU/UL (ref 140–440)
PMV BLD AUTO: 8.8 FL (ref 8.5–12.5)
POTASSIUM BLD-SCNC: 4.6 MMOL/L (ref 3.5–5)
RBC # BLD AUTO: 2.99 MILL/UL (ref 3.8–5.4)
SODIUM SERPL-SCNC: 136 MMOL/L (ref 136–145)
WBC: 8.8 THOU/UL (ref 4–11)

## 2021-07-09 PROCEDURE — 99305 1ST NF CARE MODERATE MDM 35: CPT | Performed by: INTERNAL MEDICINE

## 2021-07-09 NOTE — TELEPHONE ENCOUNTER
[facility] Nurse Triage Telephone Note    Provider: JAYDON Luis CNP   Facility: [facility] Facility Type:  TCU    Caller: Zach  Call Back Number: [phone]    Allergies:  No Known Allergies     Reason for call: Nurse called to report lab results.     Verbal Order/Direction given by Provider: No new orders.     Provider giving Order:  JAYDON Luis CNP     Verbal Order given to: Zach Larson RN

## 2021-07-09 NOTE — PROGRESS NOTES
[facility] GERIATRICS  INITIAL VISIT NOTE  July 9, 2021    PRIMARY CARE PROVIDER AND CLINIC:  Services, [facility] Physician [address] [address] [facility], [place] [address] / [facility] [id number]    Chief Complaint   Patient presents with     Hospital F/U       HPI:    Alejandra Paulino is a 88 year old  (2/27/1933) female who was seen at [facility] on July 9, 2021 for an initial visit. Medical history is notable for dementia and HTN She was hospitalized at [facility] from 7/2/21 to 7/6/21 where she presented with left hip pain after a fall at her memory care AL and was found to have left superior and inferior pubic rami fractures. Ortho consulted and recommended conservative management, WBAT and follow up in 2-3 weeks with repeat XR. She was admitted to this facility for medical management and rehab.     Today, Ms. Paulino is seen in her room sitting in a wheelchair. History limited due to her dementia. She denies any pain today. No chest pain. No back pain. No hip pain. Nursing was finishing morning vital signs - no concerns per discussion with him. She is working with therapies.     CODE STATUS:   DNR / DNI    ALLERGIES:   No Known Allergies    PAST MEDICAL HISTORY:   Dementia, HTN    PAST SURGICAL HISTORY:   No past surgical history on file.    FAMILY HISTORY:   Unable to review due to dementia     SOCIAL HISTORY:   Lives at [facility] in [facility]     MEDICATIONS:  Post Discharge Medication Reconciliation Status: discharge medications reconciled and changed, per note/orders.   Current Outpatient Medications   Medication Sig Dispense Refill     acetaminophen (TYLENOL) 500 MG tablet Take 2 tablets (1,000 mg) by mouth every 6 hours as needed for pain       acetaminophen (TYLENOL) 500 MG tablet Take 1,000 mg by mouth 3 times daily        ALPRAZolam (XANAX) 0.25 MG ODT Take 1 tablet (0.25 mg) by mouth daily at 4pm for 4 days 4 tablet 0     Calcium Carbonate (CALCIUM-CARB 600 PO) Take 1 tablet by mouth 2  "times daily       donepezil (ARICEPT) 10 MG tablet Take 10 mg by mouth At Bedtime       glucosamine-chondroitin 500-400 MG CAPS per capsule Take 1 capsule by mouth daily       lisinopril (ZESTRIL) 5 MG tablet Take 5 mg by mouth 2 times daily       metoprolol tartrate (LOPRESSOR) 50 MG tablet Take 50 mg by mouth 2 times daily       multivitamin, therapeutic (THERA-VIT) TABS tablet Take 1 tablet by mouth daily       oxybutynin (DITROPAN) 5 MG tablet Take 5 mg by mouth 3 times daily       sertraline (ZOLOFT) 50 MG tablet Take 50 mg by mouth At Bedtime         ROS:  Unable to obtain due to cognitive impairment or aphasia    PHYSICAL EXAM:  /64   Pulse 75   Temp 96.9  F (36.1  C)   Resp 18   Ht 1.626 m (5' 4\")   Wt 74.9 kg (165 lb 3.2 oz)   SpO2 100%   BMI 28.36 kg/m     Gen: sitting in wheelchair, alert, cooperative and in no acute distress  HEENT: normocephalic; oropharynx clear  Card: RRR, S1, S2, no murmurs  Resp: lungs clear to auscultation bilaterally, no crackles or wheezes  GI: abdomen soft, not-tender  MSK: decreased muscle tone, no LE edema  Neuro: CX II-XII grossly in tact; ROM in all four extremities grossly in tact  Psych: memory, judgement and insight impaired    LABORATORY/IMAGING DATA:  Reviewed as per Epic    ASSESSMENT/PLAN:    Left Superior and Inferior Pubic Rami Fractures  Secondary to a fall. Conservative management. Denies pain today  -- WBAT  -- APAP 1000 mg TID and q6h PRN  -- PT/OT  -- follow up with ortho in 2-3 weeks for repeat imaging (or 6 weeks if unable to transport)    HTN  SBPs labile in 100s-170s.   -- lisinopril 5 mg BID and metoprolol 50 mg BID  -- follow BPs and adjust medications as needed    Dementia Without Behavioral Disturbance  Lives in a memory care unit  -- donepezil 10 mg at bedtime and sertraline 50 mg at bedtime   -- alprazolam 0.25 mg at 1600   -- OT following    Overactive Bladder  -- oxybutynin 5 mg TID    Protein Calorie Malnutrition  -- Ca supplement, " MVI  -- nutrition following    Fall  Physical Deconditioning  In setting of hospitalization and underlying medical conditions  -- ongoing PT/OT      Electronically signed by:  Yazmin Cedillo MD

## 2021-07-09 NOTE — LETTER
7/9/2021        RE: Alejandra Paulino  [address]  [facility] [address]        [facility] GERIATRICS  INITIAL VISIT NOTE  July 9, 2021    PRIMARY CARE PROVIDER AND CLINIC:  Services, [facility] [address] [facility], [facility] [address] / [facility] [address]    Chief Complaint   Patient presents with     Hospital F/U       HPI:    Alejandra Paulino is a 88 year old  (2/27/1933) female who was seen at [facility] on July 9, 2021 for an initial visit. Medical history is notable for dementia and HTN She was hospitalized at [facility] from 7/2/21 to 7/6/21 where she presented with left hip pain after a fall at her memory care AL and was found to have left superior and inferior pubic rami fractures. Ortho consulted and recommended conservative management, WBAT and follow up in 2-3 weeks with repeat XR. She was admitted to this facility for medical management and rehab.     Today, Ms. Paulino is seen in her room sitting in a wheelchair. History limited due to her dementia. She denies any pain today. No chest pain. No back pain. No hip pain. Nursing was finishing morning vital signs - no concerns per discussion with him. She is working with therapies.     CODE STATUS:   DNR / DNI    ALLERGIES:   No Known Allergies    PAST MEDICAL HISTORY:   Dementia, HTN    PAST SURGICAL HISTORY:   No past surgical history on file.    FAMILY HISTORY:   Unable to review due to dementia     SOCIAL HISTORY:   Lives at [facility] in [facility]     MEDICATIONS:  Post Discharge Medication Reconciliation Status: discharge medications reconciled and changed, per note/orders.   Current Outpatient Medications   Medication Sig Dispense Refill     acetaminophen (TYLENOL) 500 MG tablet Take 2 tablets (1,000 mg) by mouth every 6 hours as needed for pain       acetaminophen (TYLENOL) 500 MG tablet Take 1,000 mg by mouth 3 times daily        ALPRAZolam (XANAX) 0.25 MG ODT Take 1 tablet (0.25 mg) by mouth daily at 4pm for 4  "days 4 tablet 0     Calcium Carbonate (CALCIUM-CARB 600 PO) Take 1 tablet by mouth 2 times daily       donepezil (ARICEPT) 10 MG tablet Take 10 mg by mouth At Bedtime       glucosamine-chondroitin 500-400 MG CAPS per capsule Take 1 capsule by mouth daily       lisinopril (ZESTRIL) 5 MG tablet Take 5 mg by mouth 2 times daily       metoprolol tartrate (LOPRESSOR) 50 MG tablet Take 50 mg by mouth 2 times daily       multivitamin, therapeutic (THERA-VIT) TABS tablet Take 1 tablet by mouth daily       oxybutynin (DITROPAN) 5 MG tablet Take 5 mg by mouth 3 times daily       sertraline (ZOLOFT) 50 MG tablet Take 50 mg by mouth At Bedtime         ROS:  Unable to obtain due to cognitive impairment or aphasia    PHYSICAL EXAM:  /64   Pulse 75   Temp 96.9  F (36.1  C)   Resp 18   Ht 1.626 m (5' 4\")   Wt 74.9 kg (165 lb 3.2 oz)   SpO2 100%   BMI 28.36 kg/m     Gen: sitting in wheelchair, alert, cooperative and in no acute distress  HEENT: normocephalic; oropharynx clear  Card: RRR, S1, S2, no murmurs  Resp: lungs clear to auscultation bilaterally, no crackles or wheezes  GI: abdomen soft, not-tender  MSK: decreased muscle tone, no LE edema  Neuro: CX II-XII grossly in tact; ROM in all four extremities grossly in tact  Psych: memory, judgement and insight impaired    LABORATORY/IMAGING DATA:  Reviewed as per Epic    ASSESSMENT/PLAN:    Left Superior and Inferior Pubic Rami Fractures  Secondary to a fall. Conservative management. Denies pain today  -- WBAT  -- APAP 1000 mg TID and q6h PRN  -- PT/OT  -- follow up with ortho in 2-3 weeks for repeat imaging (or 6 weeks if unable to transport)    HTN  SBPs labile in 100s-170s.   -- lisinopril 5 mg BID and metoprolol 50 mg BID  -- follow BPs and adjust medications as needed    Dementia Without Behavioral Disturbance  Lives in a memory care unit  -- donepezil 10 mg at bedtime and sertraline 50 mg at bedtime   -- alprazolam 0.25 mg at 1600   -- OT following    Overactive " Bladder  -- oxybutynin 5 mg TID    Fall  Physical Deconditioning  In setting of hospitalization and underlying medical conditions  -- ongoing PT/OT      Electronically signed by:  Yazmin Cedillo MD                          Sincerely,        Yazmin Cedillo MD

## 2021-07-19 ENCOUNTER — LAB REQUISITION (OUTPATIENT)
Dept: LAB | Facility: CLINIC | Age: 86
End: 2021-07-19

## 2021-07-19 DIAGNOSIS — R82.998 OTHER ABNORMAL FINDINGS IN URINE: ICD-10-CM

## 2021-07-19 LAB
ALBUMIN UR-MCNC: 50 MG/DL
APPEARANCE UR: ABNORMAL
BACTERIA #/AREA URNS HPF: ABNORMAL /HPF
BILIRUB UR QL STRIP: NEGATIVE
COLOR UR AUTO: YELLOW
GLUCOSE UR STRIP-MCNC: NEGATIVE MG/DL
HGB UR QL STRIP: ABNORMAL
KETONES UR STRIP-MCNC: NEGATIVE MG/DL
LEUKOCYTE ESTERASE UR QL STRIP: ABNORMAL
NITRATE UR QL: POSITIVE
PH UR STRIP: 6 [PH] (ref 5–7)
RBC URINE: 24 /HPF
SP GR UR STRIP: 1.01 (ref 1–1.03)
SQUAMOUS EPITHELIAL: 7 /HPF
UROBILINOGEN UR STRIP-MCNC: <2 MG/DL
WBC CLUMPS #/AREA URNS HPF: PRESENT /HPF
WBC URINE: >182 /HPF

## 2021-07-19 PROCEDURE — 81001 URINALYSIS AUTO W/SCOPE: CPT | Performed by: INTERNAL MEDICINE

## 2021-07-19 PROCEDURE — 87086 URINE CULTURE/COLONY COUNT: CPT | Performed by: INTERNAL MEDICINE

## 2021-07-21 DIAGNOSIS — N30.00 ACUTE CYSTITIS WITHOUT HEMATURIA: Primary | ICD-10-CM

## 2021-07-21 LAB — BACTERIA UR CULT: ABNORMAL

## 2021-07-21 RX ORDER — CIPROFLOXACIN 250 MG/1
250 TABLET, FILM COATED ORAL 2 TIMES DAILY
Start: 2021-07-21 | End: 2021-08-02

## 2021-07-21 NOTE — PROGRESS NOTES
[place] GERIATRIC SERVICES TELEPHONE ENCOUNTER        Alejandra Paulino is a 88 year old  (2/27/1933),Nurse called today to report: UC > 100K    ASSESSMENT/PLAN  UTI    ORDERS:    Cipro 250 mg BID po x 5 days  Continue to encourage po fluids          Electronically signed by:   JAYDON Luis CNP

## 2021-07-22 ENCOUNTER — TRANSITIONAL CARE UNIT VISIT (OUTPATIENT)
Dept: GERIATRICS | Facility: CLINIC | Age: 86
End: 2021-07-22
Payer: MEDICARE

## 2021-07-22 VITALS
BODY MASS INDEX: 28.2 KG/M2 | HEIGHT: 64 IN | HEART RATE: 60 BPM | WEIGHT: 165.2 LBS | SYSTOLIC BLOOD PRESSURE: 133 MMHG | TEMPERATURE: 97 F | DIASTOLIC BLOOD PRESSURE: 69 MMHG | RESPIRATION RATE: 18 BRPM | OXYGEN SATURATION: 95 %

## 2021-07-22 DIAGNOSIS — M25.552 LEFT HIP PAIN: ICD-10-CM

## 2021-07-22 DIAGNOSIS — F02.81 ALZHEIMER'S DEMENTIA WITH BEHAVIORAL DISTURBANCE, UNSPECIFIED TIMING OF DEMENTIA ONSET: ICD-10-CM

## 2021-07-22 DIAGNOSIS — S32.592D CLOSED FRACTURE OF MULTIPLE RAMI OF LEFT PUBIS WITH ROUTINE HEALING, SUBSEQUENT ENCOUNTER: ICD-10-CM

## 2021-07-22 DIAGNOSIS — G30.9 ALZHEIMER'S DEMENTIA WITH BEHAVIORAL DISTURBANCE, UNSPECIFIED TIMING OF DEMENTIA ONSET: ICD-10-CM

## 2021-07-22 DIAGNOSIS — N30.00 ACUTE CYSTITIS WITHOUT HEMATURIA: Primary | ICD-10-CM

## 2021-07-22 PROCEDURE — 99309 SBSQ NF CARE MODERATE MDM 30: CPT | Performed by: NURSE PRACTITIONER

## 2021-07-22 ASSESSMENT — MIFFLIN-ST. JEOR: SCORE: 1164.34

## 2021-07-22 NOTE — PROGRESS NOTES
"[facility] GERIATRIC SERVICES    Chief Complaint   Patient presents with     Nursing Home Acute     HPI:  Alejandra Paulino is a 88 year old  (2/27/1933), who is being seen today for an episodic care visit at: [facility] ([facility]) [[id number]]. Today's concern is:     Patient in TCU for acute rehab and medical management for and subsequent left inferior and superior rami fractures.  Patient is being seen today for a recheck and to follow-up on pain management and urinary infection which was recently diagnosed in the TCU.    Today patient is found sitting up in wheelchair.  Calm and in no acute distress.  Mood is bright patient laughing quite bit.  Had been working with speech therapy.  Denies pain at this time.    Allergies, and PMH/PSH reviewed in [facility] today.  REVIEW OF SYSTEMS:  Limited secondary to cognitive impairment but today pt reports as above    Objective:   /69   Pulse 60   Temp 97  F (36.1  C)   Resp 18   Ht 1.626 m (5' 4\")   Wt 74.9 kg (165 lb 3.2 oz)   SpO2 95%   BMI 28.36 kg/m      Resp: Effort WNL  CV: VS as above, trace bilateral LE edema  Abd- soft, nontender, BS +  Musc- SAMPSON- w/c  Psych- alert, calm, pleasant      Recent labs in [facility] reviewed by me today.     Assessment/Plan:  Acute cystitis without hematuria  > 100 K E Coli- VSS, no active s/s  - completing 5 day course of CiproLeft hip pain  - follow clinically    Closed fracture of multiple rami of left pubis with routine healing, subsequent encounter  - after fall  - WBAT, PT/OT  - Supportive cares and treatments  - ongoing discharge planning,  follow and care conferences per unit protocol      Alzheimer's dementia with behavioral disturbance, unspecified timing of dementia onset (H)  - impulsive, two falls in TCU  - resides in memory care and plan is to return   - continues on Aricept, prn Xanax- culprit med- consider discontinuation -   - supportive cares and treatments, anticipate needs, toilet program, safety checks    - other " chronic conditions reviewed/stable on current regimens        Electronically signed by: JAYDON Luis CNP

## 2021-07-23 RX ORDER — TRAMADOL HYDROCHLORIDE 50 MG/1
25 TABLET ORAL EVERY 6 HOURS PRN
Qty: 10 TABLET | Refills: 0 | Status: SHIPPED | OUTPATIENT
Start: 2021-07-23 | End: 2021-08-02

## 2021-07-24 ENCOUNTER — TELEPHONE (OUTPATIENT)
Dept: GERIATRICS | Facility: CLINIC | Age: 86
End: 2021-07-24

## 2021-07-24 NOTE — TELEPHONE ENCOUNTER
"Assessment:  -Left shoulder pain  -Fall yesterday  -History of \"broken shoulder\" in the past       Plan:  -2 view xray of the upper right arm/shoulder  -Continue with tylenol and tramdol for pain.      Electronically signed by  Darlene Huang CNP      "

## 2021-07-26 ENCOUNTER — TRANSITIONAL CARE UNIT VISIT (OUTPATIENT)
Dept: GERIATRICS | Facility: CLINIC | Age: 86
End: 2021-07-26
Payer: MEDICARE

## 2021-07-26 VITALS
OXYGEN SATURATION: 96 % | TEMPERATURE: 97.6 F | RESPIRATION RATE: 19 BRPM | BODY MASS INDEX: 28.2 KG/M2 | HEIGHT: 64 IN | SYSTOLIC BLOOD PRESSURE: 118 MMHG | HEART RATE: 77 BPM | DIASTOLIC BLOOD PRESSURE: 62 MMHG | WEIGHT: 165.2 LBS

## 2021-07-26 DIAGNOSIS — G30.9 ALZHEIMER'S DEMENTIA WITH BEHAVIORAL DISTURBANCE, UNSPECIFIED TIMING OF DEMENTIA ONSET: ICD-10-CM

## 2021-07-26 DIAGNOSIS — R53.81 PHYSICAL DECONDITIONING: ICD-10-CM

## 2021-07-26 DIAGNOSIS — N39.0 URINARY TRACT INFECTION WITHOUT HEMATURIA, SITE UNSPECIFIED: ICD-10-CM

## 2021-07-26 DIAGNOSIS — R52 PAIN: ICD-10-CM

## 2021-07-26 DIAGNOSIS — S32.592A CLOSED FRACTURE OF RAMUS OF LEFT PUBIS, INITIAL ENCOUNTER (H): Primary | ICD-10-CM

## 2021-07-26 DIAGNOSIS — F02.81 ALZHEIMER'S DEMENTIA WITH BEHAVIORAL DISTURBANCE, UNSPECIFIED TIMING OF DEMENTIA ONSET: ICD-10-CM

## 2021-07-26 PROCEDURE — 99309 SBSQ NF CARE MODERATE MDM 30: CPT | Performed by: NURSE PRACTITIONER

## 2021-07-26 ASSESSMENT — MIFFLIN-ST. JEOR: SCORE: 1164.34

## 2021-07-26 NOTE — PROGRESS NOTES
"[facility] GERIATRIC SERVICES    Chief Complaint   Patient presents with     RECHECK     HPI:  Alejandra Paulino is a 88 year old  (2/27/1933), who is being seen today for an episodic care visit at: [facility] [[id number]]. Today's concern is:  F/u to pain and UTI    Allergies, and PMH/PSH reviewed in [facility] today.  REVIEW OF SYSTEMS:  Limited secondary to cognitive impairment but today pt reports doing well    Objective:   /62   Pulse 77   Temp 97.6  F (36.4  C)   Resp 19   Ht 1.626 m (5' 4\")   Wt 74.9 kg (165 lb 3.2 oz)   SpO2 96%   BMI 28.36 kg/m      Resp: Effort WNL  CV: VS as above- no edema  Abd- soft, nontender, BS +  Musc- SAMPSON  Psych- alert, calm, pleasant- confused and forgetful        Recent labs in [facility] reviewed by me today.     Assessment/Plan:  Closed fracture of ramus of left pubis, initial encounter (H)  Pain  Physical deconditioning  - appears comfortable today  - continue PT/OT  - continue current pain regimen with acetaminophen and prn tramadol    Urinary tract infection without hematuria, site unspecified  - no active s/s - > 100k Ecoli  - completed Cipro 7/26    Alzheimer's dementia with behavioral disturbance, unspecified timing of dementia onset (H)  - chronic,   - impulsive, two falls in U  - resides in memory care and plan is to return   - continues on Aricept, Zoloft - d/cd prn Xanax- culprit med-   - continue supportive cares and treatments, anticipate needs, toilet program, safety checks    Electronically signed by: Stephanie Nolan, JAYDON CNP       "

## 2021-08-02 RX ORDER — TRAMADOL HYDROCHLORIDE 50 MG/1
25 TABLET ORAL EVERY 6 HOURS PRN
Qty: 10 TABLET | Refills: 0
Start: 2021-08-02 | End: 2021-08-03

## 2021-08-03 ENCOUNTER — DISCHARGE SUMMARY NURSING HOME (OUTPATIENT)
Dept: GERIATRICS | Facility: CLINIC | Age: 86
End: 2021-08-03
Payer: MEDICARE

## 2021-08-03 VITALS
OXYGEN SATURATION: 98 % | DIASTOLIC BLOOD PRESSURE: 52 MMHG | RESPIRATION RATE: 18 BRPM | TEMPERATURE: 98 F | SYSTOLIC BLOOD PRESSURE: 117 MMHG | WEIGHT: 165.2 LBS | HEIGHT: 64 IN | BODY MASS INDEX: 28.2 KG/M2 | HEART RATE: 79 BPM

## 2021-08-03 DIAGNOSIS — F02.81 ALZHEIMER'S DEMENTIA WITH BEHAVIORAL DISTURBANCE, UNSPECIFIED TIMING OF DEMENTIA ONSET: ICD-10-CM

## 2021-08-03 DIAGNOSIS — R52 PAIN: ICD-10-CM

## 2021-08-03 DIAGNOSIS — G30.9 ALZHEIMER'S DEMENTIA WITH BEHAVIORAL DISTURBANCE, UNSPECIFIED TIMING OF DEMENTIA ONSET: ICD-10-CM

## 2021-08-03 DIAGNOSIS — S32.592A CLOSED FRACTURE OF RAMUS OF LEFT PUBIS, INITIAL ENCOUNTER (H): Primary | ICD-10-CM

## 2021-08-03 DIAGNOSIS — R53.81 PHYSICAL DECONDITIONING: ICD-10-CM

## 2021-08-03 DIAGNOSIS — I10 ESSENTIAL HYPERTENSION: ICD-10-CM

## 2021-08-03 PROCEDURE — 99316 NF DSCHRG MGMT 30 MIN+: CPT | Performed by: NURSE PRACTITIONER

## 2021-08-03 RX ORDER — TRAMADOL HYDROCHLORIDE 50 MG/1
25 TABLET ORAL EVERY 6 HOURS PRN
Qty: 10 TABLET | Refills: 0 | Status: SHIPPED | OUTPATIENT
Start: 2021-08-03

## 2021-08-03 ASSESSMENT — MIFFLIN-ST. JEOR: SCORE: 1164.34

## 2021-08-03 NOTE — PROGRESS NOTES
"  DME (Durable Medical Equipment) Orders and Documentation  Orders Placed This Encounter   Procedures     Wheelchair Order     Hospital Bed Order     Bindu Lift Order      The patient was assessed and it was determined the patient is in need of the following listed DME Supplies/Equipment. Please complete supporting documentation below to demonstrate medical necessity.      Hospital Bed/Accessories Documentation  Hospital bed is required for body positioning, to allow for safe transfers to wheelchair and standing and frequent changes in body position, not feasible in an ordinary bed     NOTE: Patient must have a \"Yes\" in one of the four following questions to qualify for a hospital bed.    1. Does the patient require positioning of the body in ways not feasible with an ordinary bed due to a medical condition that is expected to last at least 1 month? Yes (Please explain): Multiple pelvic fractures and associated pain, decondition    2. Does the patient require, for the alleviation of pain, positioning of the body in ways not feasible with an ordinary bed? Yes (Please explain): same as above     3. Does the patient require the head of the bed to be elevated more than 30 degrees most of the time due to congestive heart failure, chronic pulmonary disease, or aspiration? No    4. Does the patient require traction that can only be attached to a hospital bed? No    Additional Criteria:    Does the patient require frequent changes in body position and/or have an immediate need for change in body position? Yes - Patient qualifies for Semi Electric Bed     Trapeze Criteria:  (Patient must meet standard hospital bed criteria also)   1. Does patient need this device to sit up because of a respiratory condition, for change in body position for other medical reasons, or to get in or out of bed? No.     Wheelchair Documentation  1. The patient has mobility limitations that impairs their ability to participate in one or more " mobility related activities: Toileting, Feeding, Grooming and Bathing.  2. The patient's mobility limitations cannot be safely resolved by using a cane/walker:No  3. The patients home has adequate access to use a manual wheelchair:Yes  4. The use of a manual wheelchair on a regular basis will improve the patients ability to participate in mobility related ADL's at home:Yes  5. The patient is willing to use a manual wheelchair at home:Yes  6. The patient has adequate upper body strength and the mental capability to safely use a manual wheelchair and/or has a caregiver that is able to assist: Yes  7. Does the patient have a lower extremity injury or edema?No     Length of need: Lifetime    Reason for Type of Wheelchair  Patient weight: 165 lbs 3.2 oz      Use of a manual wheelchair will significantly improve the patient's ability to participate in MRADLs and the patient will use it on a regular basis in the home. The patient has not expressed an unwillingness to use the manual wheelchair that is provided in the home.    Length of need: Lifetime    Bindu Lift Documentation  Patient is non-weight bearing: No    Patient is unable to safely transfer without the significant assistance second to multiple pelvic fractures, pain and dementia (unalbe to recall safety instructions) and it has been deemed an EZ lift is necessary to safely transfer patient from w/c to bed and back. The patient would be bed bound without the use of the stand lift     Length of need: Lifetime    ROS: Limited d/t cognitive impairment but reports is doing well. Denies pain currently.    Resp: Effort WNL  CV: VS as above  Abd- soft, nontender, BS +  Musc- SAMPSON - in w/c  Psych- alert, calm, forgetful, pleasant      1. Closed fracture of ramus of left pubis, initial encounter (H)    2. Pain    3. Physical deconditioning    4. Alzheimer's dementia with behavioral disturbance, unspecified timing of dementia onset (H)      ORDERS: DME as above      Stephanie  JAYDON Lutz CNP on 8/3/2021 at 12:08 PM

## 2021-08-03 NOTE — PROGRESS NOTES
[facility] GERIATRIC SERVICES DISCHARGE SUMMARY  PATIENT'S NAME: Alejandra Paulino  YOB: 1933  MEDICAL RECORD NUMBER:  [id number]  Place of Service where encounter took place:  [facility] ([facility]) [[id number]]    PRIMARY CARE PROVIDER AND CLINIC RESPONSIBLE AFTER TRANSFER:   [facility], [address] [place] [id number]    Assisted Living     Transferring providers: JAYDON Luis CNP, Yazmin Cedillo MD  Recent Hospitalization/ED:  [facility] Hospital stay 7/2/21 to 7/6/21.  Date of SNF Admission: July 06, 2021  Date of SNF (anticipated) Discharge: August 04, 2021  Discharged to: new assisted living for patient   Cognitive Scores: Is cognitively impaired  Physical Function:  W/C dependent for MRADLs..   DME: W/c, Hospital bed, Lift    CODE STATUS/ADVANCE DIRECTIVES DISCUSSION:  No CPR- Do NOT Intubate     ALLERGIES: Patient has no known allergies.         NURSING FACILITY COURSE   Medication Changes/Rationale:     As outlined in note    Summary of nursing facility stay:     88 y.o female with PMH of Alzheimer's dementia, pacemaker, HTN admitted to TCU for acute rehab and medical management after hospitalization as above for pelvic pain after a fall. Imaging revealed left inferior and superior pubic rami fractures. TCU stay recommended at hospital discharge.    Issues addressed in TCU:    Acute cystitis without hematuria  > 100 K E Coli- VSS, no active s/s  - completed 5 day course of CiproLeft       Closed fracture of multiple rami of left pubis with routine healing, subsequent encounter  Left hip pain  Physical deconditioning  - after fall  - WBAT,  - discharge to A/L with supportive cares and treatments  - continue on scheduled and prn acetaminophen and on prn Tramadol  - Home PT/OT  - will require W/C, hospital bed and lift for safety with transfers, mobility and MRADLs     Essential Hypertension  -chronic, controlled  - continue on  "metoprolol and lisinopril     Alzheimer's dementia with behavioral disturbance, unspecified timing of dementia onset (H)  - impulsive, two falls in TCU  - discharge to memory care A/L   - continues on Aricept, prn Xanax- culprit med-  discontinued early on.   - supportive cares and treatments, anticipate needs, toilet program, safety checks  - continues on Aricept and Zoloft    Discharge Medications:      Current Outpatient Medications   Medication Sig Dispense Refill     acetaminophen (TYLENOL) 500 MG tablet Take 2 tablets (1,000 mg) by mouth every 6 hours as needed for pain       acetaminophen (TYLENOL) 500 MG tablet Take 1,000 mg by mouth 3 times daily        Calcium Carbonate (CALCIUM-CARB 600 PO) Take 1 tablet by mouth 2 times daily       donepezil (ARICEPT) 10 MG tablet Take 10 mg by mouth At Bedtime       glucosamine-chondroitin 500-400 MG CAPS per capsule Take 1 capsule by mouth daily       lisinopril (ZESTRIL) 5 MG tablet Take 5 mg by mouth 2 times daily       metoprolol tartrate (LOPRESSOR) 50 MG tablet Take 50 mg by mouth 2 times daily       multivitamin, therapeutic (THERA-VIT) TABS tablet Take 1 tablet by mouth daily       oxybutynin (DITROPAN) 5 MG tablet Take 5 mg by mouth 3 times daily       sertraline (ZOLOFT) 50 MG tablet Take 50 mg by mouth At Bedtime       traMADol (ULTRAM) 50 MG tablet Take 0.5 tablets (25 mg) by mouth every 6 hours as needed for severe pain 10 tablet 0        Controlled medications:   Medication: Tramadol 25 mg , remaining tabs given to patient at the time of discharge to take home     Past Medical History: No past medical history on file.  Physical Exam:   Vitals: /52   Pulse 79   Temp 98  F (36.7  C)   Resp 18   Ht 1.626 m (5' 4\")   Wt 74.9 kg (165 lb 3.2 oz)   SpO2 98%   BMI 28.36 kg/m    BMI= Body mass index is 28.36 kg/m .    Resp: Effort WNL  CV: VS as above, no edema noted  Abd- soft, nontender, BS +  Musc- SAMPSON- w/c  Psych- alert, calm, pleasant, confused, " forgetfuly       SNF labs: Recent labs in EPIC reviewed by me today.     DISCHARGE PLAN:    Follow up labs: No labs orders/due    Medical Follow Up:      Follow up with primary care provider in 1 week           Discharge Services: Home Care:  Occupational Therapy and Physical Therapy    TOTAL DISCHARGE TIME:   Greater than 30 minutes  Electronically signed by:  JAYDON Luis CNP     Documentation of Face to Face and Certification for Home Health Services    I certify that patient: Alejandra Paulino is under my care and that I, or a nurse practitioner or physician's assistant working with me, had a face-to-face encounter that meets the physician face-to-face encounter requirements with this patient on: 8/3/2021.    This encounter with the patient was in whole, or in part, for the following medical condition, which is the primary reason for home health care: as above.    I certify that, based on my findings, the following services are medically necessary home health services: Occupational Therapy and Physical Therapy.    My clinical findings support the need for the above services because: Occupational Therapy Services are needed to assess and treat cognitive ability and address ADL safety due to impairment in MRADLs. and Physical Therapy Services are needed to assess and treat the following functional impairments: Walking.    Further, I certify that my clinical findings support that this patient is homebound (i.e. absences from home require considerable and taxing effort and are for medical reasons or [employer] services or infrequently or of short duration when for other reasons) because: Requires assistance of another person or specialized equipment to access medical services because patient: Has prohibitive pain during ambulation. and Requires supervision of another for safe transfer...    Based on the above findings. I certify that this patient is confined to the home and needs intermittent  skilled nursing care, physical therapy and/or speech therapy.  The patient is under my care, and I have initiated the establishment of the plan of care.  This patient will be followed by a physician who will periodically review the plan of care.  Physician/Provider to provide follow up care: Services, [facility]    Attending hospital physician (the Medicare certified [facility] provider): JAYDON Luis CNP  Physician Signature: See electronic signature associated with these discharge orders.  Date: 8/4/2021

## 2021-08-23 ENCOUNTER — LAB REQUISITION (OUTPATIENT)
Dept: LAB | Facility: CLINIC | Age: 86
End: 2021-08-23
Payer: MEDICARE

## 2021-08-23 DIAGNOSIS — I10 ESSENTIAL (PRIMARY) HYPERTENSION: ICD-10-CM

## 2021-08-23 DIAGNOSIS — D64.9 ANEMIA, UNSPECIFIED: ICD-10-CM

## 2021-08-24 LAB
ANION GAP SERPL CALCULATED.3IONS-SCNC: 9 MMOL/L (ref 5–18)
BUN SERPL-MCNC: 8 MG/DL (ref 8–28)
CALCIUM SERPL-MCNC: 8.6 MG/DL (ref 8.5–10.5)
CHLORIDE BLD-SCNC: 96 MMOL/L (ref 98–107)
CO2 SERPL-SCNC: 27 MMOL/L (ref 22–31)
CREAT SERPL-MCNC: 0.69 MG/DL (ref 0.6–1.1)
ERYTHROCYTE [DISTWIDTH] IN BLOOD BY AUTOMATED COUNT: 14.7 % (ref 10–15)
GFR SERPL CREATININE-BSD FRML MDRD: 78 ML/MIN/1.73M2
GLUCOSE BLD-MCNC: 94 MG/DL (ref 70–125)
HCT VFR BLD AUTO: 34.2 % (ref 35–47)
HGB BLD-MCNC: 11.1 G/DL (ref 11.7–15.7)
MCH RBC QN AUTO: 29.8 PG (ref 26.5–33)
MCHC RBC AUTO-ENTMCNC: 32.5 G/DL (ref 31.5–36.5)
MCV RBC AUTO: 92 FL (ref 78–100)
PLATELET # BLD AUTO: 281 10E3/UL (ref 150–450)
POTASSIUM BLD-SCNC: 4 MMOL/L (ref 3.5–5)
RBC # BLD AUTO: 3.72 10E6/UL (ref 3.8–5.2)
SODIUM SERPL-SCNC: 132 MMOL/L (ref 136–145)
WBC # BLD AUTO: 7.5 10E3/UL (ref 4–11)

## 2021-08-24 PROCEDURE — 85027 COMPLETE CBC AUTOMATED: CPT | Mod: ORL | Performed by: PHYSICIAN ASSISTANT

## 2021-08-24 PROCEDURE — P9604 ONE-WAY ALLOW PRORATED TRIP: HCPCS | Mod: ORL | Performed by: PHYSICIAN ASSISTANT

## 2021-08-24 PROCEDURE — 80048 BASIC METABOLIC PNL TOTAL CA: CPT | Mod: ORL | Performed by: PHYSICIAN ASSISTANT

## 2021-08-24 PROCEDURE — 36415 COLL VENOUS BLD VENIPUNCTURE: CPT | Mod: ORL | Performed by: PHYSICIAN ASSISTANT

## 2022-01-01 ENCOUNTER — APPOINTMENT (OUTPATIENT)
Dept: CT IMAGING | Facility: CLINIC | Age: 87
End: 2022-01-01
Attending: EMERGENCY MEDICINE
Payer: MEDICARE

## 2022-01-01 ENCOUNTER — LAB REQUISITION (OUTPATIENT)
Dept: LAB | Facility: CLINIC | Age: 87
End: 2022-01-01
Payer: MEDICARE

## 2022-01-01 ENCOUNTER — DOCUMENTATION ONLY (OUTPATIENT)
Dept: OTHER | Facility: CLINIC | Age: 87
End: 2022-01-01

## 2022-01-01 ENCOUNTER — HOSPITAL ENCOUNTER (EMERGENCY)
Facility: CLINIC | Age: 87
Discharge: HOME | End: 2022-09-10
Attending: EMERGENCY MEDICINE | Admitting: EMERGENCY MEDICINE
Payer: MEDICARE

## 2022-01-01 ENCOUNTER — APPOINTMENT (OUTPATIENT)
Dept: GENERAL RADIOLOGY | Facility: CLINIC | Age: 87
End: 2022-01-01
Attending: EMERGENCY MEDICINE
Payer: MEDICARE

## 2022-01-01 ENCOUNTER — HOSPITAL ENCOUNTER (EMERGENCY)
Facility: CLINIC | Age: 87
Discharge: HOME | End: 2022-07-12
Attending: EMERGENCY MEDICINE | Admitting: EMERGENCY MEDICINE
Payer: MEDICARE

## 2022-01-01 ENCOUNTER — RESULTS ONLY (OUTPATIENT)
Dept: ADMINISTRATIVE | Facility: CLINIC | Age: 87
End: 2022-01-01

## 2022-01-01 VITALS
TEMPERATURE: 97.8 F | HEART RATE: 69 BPM | SYSTOLIC BLOOD PRESSURE: 133 MMHG | RESPIRATION RATE: 13 BRPM | DIASTOLIC BLOOD PRESSURE: 73 MMHG | OXYGEN SATURATION: 93 %

## 2022-01-01 VITALS
OXYGEN SATURATION: 94 % | RESPIRATION RATE: 20 BRPM | TEMPERATURE: 97.5 F | DIASTOLIC BLOOD PRESSURE: 66 MMHG | SYSTOLIC BLOOD PRESSURE: 129 MMHG | HEART RATE: 90 BPM

## 2022-01-01 DIAGNOSIS — S02.85XA CLOSED FRACTURE OF ORBIT, INITIAL ENCOUNTER (H): ICD-10-CM

## 2022-01-01 DIAGNOSIS — S02.401A CLOSED FRACTURE OF MAXILLA, UNSPECIFIED LATERALITY, INITIAL ENCOUNTER (H): ICD-10-CM

## 2022-01-01 DIAGNOSIS — S02.2XXA CLOSED FRACTURE OF NASAL BONE, INITIAL ENCOUNTER: ICD-10-CM

## 2022-01-01 DIAGNOSIS — E55.9 VITAMIN D DEFICIENCY, UNSPECIFIED: ICD-10-CM

## 2022-01-01 DIAGNOSIS — I10 ESSENTIAL (PRIMARY) HYPERTENSION: ICD-10-CM

## 2022-01-01 DIAGNOSIS — S02.122A: ICD-10-CM

## 2022-01-01 DIAGNOSIS — H11.32 SCLERAL HEMORRHAGE OF LEFT EYE: ICD-10-CM

## 2022-01-01 DIAGNOSIS — D64.9 ANEMIA, UNSPECIFIED: ICD-10-CM

## 2022-01-01 DIAGNOSIS — S00.03XA CONTUSION OF SCALP, INITIAL ENCOUNTER: ICD-10-CM

## 2022-01-01 DIAGNOSIS — R30.0 DYSURIA: ICD-10-CM

## 2022-01-01 DIAGNOSIS — E03.9 HYPOTHYROIDISM, UNSPECIFIED: ICD-10-CM

## 2022-01-01 DIAGNOSIS — S72.001A HIP FRACTURE, RIGHT, CLOSED, INITIAL ENCOUNTER (H): ICD-10-CM

## 2022-01-01 LAB
ALBUMIN SERPL-MCNC: 3.5 G/DL (ref 3.5–5)
ALBUMIN UR-MCNC: 200 MG/DL
ALP SERPL-CCNC: 56 U/L (ref 45–120)
ALT SERPL W P-5'-P-CCNC: <9 U/L (ref 0–45)
ANION GAP SERPL CALCULATED.3IONS-SCNC: 4 MMOL/L (ref 3–14)
ANION GAP SERPL CALCULATED.3IONS-SCNC: 6 MMOL/L (ref 3–14)
ANION GAP SERPL CALCULATED.3IONS-SCNC: 9 MMOL/L (ref 5–18)
APPEARANCE UR: ABNORMAL
AST SERPL W P-5'-P-CCNC: 21 U/L (ref 0–40)
ATRIAL RATE - MUSE: 84 BPM
ATRIAL RATE - MUSE: 86 BPM
BACTERIA UR CULT: ABNORMAL
BACTERIA UR CULT: ABNORMAL
BASOPHILS # BLD AUTO: 0.1 10E3/UL (ref 0–0.2)
BASOPHILS # BLD AUTO: 0.1 10E3/UL (ref 0–0.2)
BASOPHILS NFR BLD AUTO: 1 %
BASOPHILS NFR BLD AUTO: 1 %
BILIRUB SERPL-MCNC: 0.6 MG/DL (ref 0–1)
BILIRUB UR QL STRIP: NEGATIVE
BUN SERPL-MCNC: 13 MG/DL (ref 8–28)
BUN SERPL-MCNC: 15 MG/DL (ref 7–30)
BUN SERPL-MCNC: 24 MG/DL (ref 7–30)
CALCIUM SERPL-MCNC: 8.9 MG/DL (ref 8.5–10.1)
CALCIUM SERPL-MCNC: 9 MG/DL (ref 8.5–10.1)
CALCIUM SERPL-MCNC: 9.3 MG/DL (ref 8.5–10.5)
CHLORIDE BLD-SCNC: 101 MMOL/L (ref 98–107)
CHLORIDE BLD-SCNC: 102 MMOL/L (ref 94–109)
CHLORIDE BLD-SCNC: 104 MMOL/L (ref 94–109)
CO2 SERPL-SCNC: 25 MMOL/L (ref 20–32)
CO2 SERPL-SCNC: 27 MMOL/L (ref 22–31)
CO2 SERPL-SCNC: 30 MMOL/L (ref 20–32)
COLOR UR AUTO: ABNORMAL
CREAT SERPL-MCNC: 0.64 MG/DL (ref 0.52–1.04)
CREAT SERPL-MCNC: 0.8 MG/DL (ref 0.6–1.1)
CREAT SERPL-MCNC: 0.85 MG/DL (ref 0.52–1.04)
DEPRECATED CALCIDIOL+CALCIFEROL SERPL-MC: 43 UG/L (ref 30–80)
DIASTOLIC BLOOD PRESSURE - MUSE: NORMAL MMHG
DIASTOLIC BLOOD PRESSURE - MUSE: NORMAL MMHG
EOSINOPHIL # BLD AUTO: 0.2 10E3/UL (ref 0–0.7)
EOSINOPHIL # BLD AUTO: 0.3 10E3/UL (ref 0–0.7)
EOSINOPHIL NFR BLD AUTO: 3 %
EOSINOPHIL NFR BLD AUTO: 4 %
ERYTHROCYTE [DISTWIDTH] IN BLOOD BY AUTOMATED COUNT: 12.6 % (ref 10–15)
ERYTHROCYTE [DISTWIDTH] IN BLOOD BY AUTOMATED COUNT: 12.7 % (ref 10–15)
ERYTHROCYTE [DISTWIDTH] IN BLOOD BY AUTOMATED COUNT: 12.8 % (ref 10–15)
GFR SERPL CREATININE-BSD FRML MDRD: 65 ML/MIN/1.73M2
GFR SERPL CREATININE-BSD FRML MDRD: 70 ML/MIN/1.73M2
GFR SERPL CREATININE-BSD FRML MDRD: 84 ML/MIN/1.73M2
GLUCOSE BLD-MCNC: 77 MG/DL (ref 70–125)
GLUCOSE BLD-MCNC: 85 MG/DL (ref 70–99)
GLUCOSE BLD-MCNC: 96 MG/DL (ref 70–99)
GLUCOSE UR STRIP-MCNC: NEGATIVE MG/DL
HCT VFR BLD AUTO: 31.8 % (ref 35–47)
HCT VFR BLD AUTO: 33.8 % (ref 35–47)
HCT VFR BLD AUTO: 36.3 % (ref 35–47)
HGB BLD-MCNC: 10.6 G/DL (ref 11.7–15.7)
HGB BLD-MCNC: 11.4 G/DL (ref 11.7–15.7)
HGB BLD-MCNC: 9.9 G/DL (ref 11.7–15.7)
HGB UR QL STRIP: ABNORMAL
IMM GRANULOCYTES # BLD: 0 10E3/UL
IMM GRANULOCYTES # BLD: 0.1 10E3/UL
IMM GRANULOCYTES NFR BLD: 0 %
IMM GRANULOCYTES NFR BLD: 1 %
INTERPRETATION ECG - MUSE: NORMAL
INTERPRETATION ECG - MUSE: NORMAL
KETONES UR STRIP-MCNC: NEGATIVE MG/DL
LEUKOCYTE ESTERASE UR QL STRIP: ABNORMAL
LYMPHOCYTES # BLD AUTO: 1.3 10E3/UL (ref 0.8–5.3)
LYMPHOCYTES # BLD AUTO: 1.6 10E3/UL (ref 0.8–5.3)
LYMPHOCYTES NFR BLD AUTO: 19 %
LYMPHOCYTES NFR BLD AUTO: 25 %
MCH RBC QN AUTO: 30.4 PG (ref 26.5–33)
MCH RBC QN AUTO: 30.6 PG (ref 26.5–33)
MCH RBC QN AUTO: 30.7 PG (ref 26.5–33)
MCHC RBC AUTO-ENTMCNC: 31.1 G/DL (ref 31.5–36.5)
MCHC RBC AUTO-ENTMCNC: 31.4 G/DL (ref 31.5–36.5)
MCHC RBC AUTO-ENTMCNC: 31.4 G/DL (ref 31.5–36.5)
MCV RBC AUTO: 97 FL (ref 78–100)
MCV RBC AUTO: 98 FL (ref 78–100)
MCV RBC AUTO: 98 FL (ref 78–100)
MONOCYTES # BLD AUTO: 0.6 10E3/UL (ref 0–1.3)
MONOCYTES # BLD AUTO: 0.8 10E3/UL (ref 0–1.3)
MONOCYTES NFR BLD AUTO: 12 %
MONOCYTES NFR BLD AUTO: 9 %
NEUTROPHILS # BLD AUTO: 3.9 10E3/UL (ref 1.6–8.3)
NEUTROPHILS # BLD AUTO: 4.3 10E3/UL (ref 1.6–8.3)
NEUTROPHILS NFR BLD AUTO: 61 %
NEUTROPHILS NFR BLD AUTO: 64 %
NITRATE UR QL: POSITIVE
NRBC # BLD AUTO: 0 10E3/UL
NRBC # BLD AUTO: 0 10E3/UL
NRBC BLD AUTO-RTO: 0 /100
NRBC BLD AUTO-RTO: 0 /100
P AXIS - MUSE: 79 DEGREES
P AXIS - MUSE: 88 DEGREES
PH UR STRIP: 7 [PH] (ref 5–7)
PLATELET # BLD AUTO: 176 10E3/UL (ref 150–450)
PLATELET # BLD AUTO: 195 10E3/UL (ref 150–450)
PLATELET # BLD AUTO: 200 10E3/UL (ref 150–450)
POTASSIUM BLD-SCNC: 4.2 MMOL/L (ref 3.5–5)
POTASSIUM BLD-SCNC: 4.6 MMOL/L (ref 3.4–5.3)
POTASSIUM BLD-SCNC: 5 MMOL/L (ref 3.4–5.3)
PR INTERVAL - MUSE: NORMAL MS
PR INTERVAL - MUSE: NORMAL MS
PROT SERPL-MCNC: 6.7 G/DL (ref 6–8)
QRS DURATION - MUSE: 174 MS
QRS DURATION - MUSE: 176 MS
QT - MUSE: 382 MS
QT - MUSE: 424 MS
QTC - MUSE: 555 MS
QTC - MUSE: 585 MS
R AXIS - MUSE: -70 DEGREES
R AXIS - MUSE: -72 DEGREES
RBC # BLD AUTO: 3.26 10E6/UL (ref 3.8–5.2)
RBC # BLD AUTO: 3.45 10E6/UL (ref 3.8–5.2)
RBC # BLD AUTO: 3.73 10E6/UL (ref 3.8–5.2)
RBC URINE: 8 /HPF
SARS-COV-2 RNA RESP QL NAA+PROBE: NEGATIVE
SODIUM SERPL-SCNC: 133 MMOL/L (ref 133–144)
SODIUM SERPL-SCNC: 137 MMOL/L (ref 136–145)
SODIUM SERPL-SCNC: 138 MMOL/L (ref 133–144)
SP GR UR STRIP: 1.01 (ref 1–1.03)
SYSTOLIC BLOOD PRESSURE - MUSE: NORMAL MMHG
SYSTOLIC BLOOD PRESSURE - MUSE: NORMAL MMHG
T AXIS - MUSE: 82 DEGREES
T AXIS - MUSE: 88 DEGREES
TSH SERPL DL<=0.005 MIU/L-ACNC: 1.54 UIU/ML (ref 0.3–5)
UROBILINOGEN UR STRIP-MCNC: NORMAL MG/DL
VENTRICULAR RATE- MUSE: 103 BPM
VENTRICULAR RATE- MUSE: 141 BPM
WBC # BLD AUTO: 6.3 10E3/UL (ref 4–11)
WBC # BLD AUTO: 6.4 10E3/UL (ref 4–11)
WBC # BLD AUTO: 6.7 10E3/UL (ref 4–11)
WBC CLUMPS #/AREA URNS HPF: PRESENT /HPF
WBC URINE: >182 /HPF

## 2022-01-01 PROCEDURE — U0003 INFECTIOUS AGENT DETECTION BY NUCLEIC ACID (DNA OR RNA); SEVERE ACUTE RESPIRATORY SYNDROME CORONAVIRUS 2 (SARS-COV-2) (CORONAVIRUS DISEASE [COVID-19]), AMPLIFIED PROBE TECHNIQUE, MAKING USE OF HIGH THROUGHPUT TECHNOLOGIES AS DESCRIBED BY CMS-2020-01-R: HCPCS | Performed by: EMERGENCY MEDICINE

## 2022-01-01 PROCEDURE — 87086 URINE CULTURE/COLONY COUNT: CPT | Mod: ORL | Performed by: FAMILY MEDICINE

## 2022-01-01 PROCEDURE — 80053 COMPREHEN METABOLIC PANEL: CPT | Mod: ORL | Performed by: PHYSICIAN ASSISTANT

## 2022-01-01 PROCEDURE — 84443 ASSAY THYROID STIM HORMONE: CPT | Mod: ORL | Performed by: PHYSICIAN ASSISTANT

## 2022-01-01 PROCEDURE — 81001 URINALYSIS AUTO W/SCOPE: CPT | Mod: ORL | Performed by: FAMILY MEDICINE

## 2022-01-01 PROCEDURE — G1010 CDSM STANSON: HCPCS

## 2022-01-01 PROCEDURE — 99285 EMERGENCY DEPT VISIT HI MDM: CPT | Mod: 25

## 2022-01-01 PROCEDURE — 73502 X-RAY EXAM HIP UNI 2-3 VIEWS: CPT

## 2022-01-01 PROCEDURE — 82306 VITAMIN D 25 HYDROXY: CPT | Mod: ORL | Performed by: PHYSICIAN ASSISTANT

## 2022-01-01 PROCEDURE — 85027 COMPLETE CBC AUTOMATED: CPT | Mod: ORL | Performed by: PHYSICIAN ASSISTANT

## 2022-01-01 PROCEDURE — 96361 HYDRATE IV INFUSION ADD-ON: CPT

## 2022-01-01 PROCEDURE — 93005 ELECTROCARDIOGRAM TRACING: CPT

## 2022-01-01 PROCEDURE — 36415 COLL VENOUS BLD VENIPUNCTURE: CPT | Performed by: EMERGENCY MEDICINE

## 2022-01-01 PROCEDURE — 36415 COLL VENOUS BLD VENIPUNCTURE: CPT | Mod: ORL | Performed by: PHYSICIAN ASSISTANT

## 2022-01-01 PROCEDURE — P9603 ONE-WAY ALLOW PRORATED MILES: HCPCS | Mod: ORL | Performed by: PHYSICIAN ASSISTANT

## 2022-01-01 PROCEDURE — 80048 BASIC METABOLIC PNL TOTAL CA: CPT | Performed by: EMERGENCY MEDICINE

## 2022-01-01 PROCEDURE — C9803 HOPD COVID-19 SPEC COLLECT: HCPCS

## 2022-01-01 PROCEDURE — 99284 EMERGENCY DEPT VISIT MOD MDM: CPT | Mod: 25

## 2022-01-01 PROCEDURE — 96360 HYDRATION IV INFUSION INIT: CPT

## 2022-01-01 PROCEDURE — 85025 COMPLETE CBC W/AUTO DIFF WBC: CPT | Performed by: EMERGENCY MEDICINE

## 2022-01-01 PROCEDURE — 250N000013 HC RX MED GY IP 250 OP 250 PS 637: Performed by: EMERGENCY MEDICINE

## 2022-01-01 PROCEDURE — 258N000003 HC RX IP 258 OP 636: Performed by: EMERGENCY MEDICINE

## 2022-01-01 RX ORDER — MORPHINE SULFATE 10 MG/5ML
5 SOLUTION ORAL EVERY 4 HOURS PRN
Qty: 40 ML | Refills: 0 | Status: SHIPPED | OUTPATIENT
Start: 2022-01-01 | End: 2022-01-01

## 2022-01-01 RX ORDER — MORPHINE SULFATE 20 MG/ML
5 SOLUTION ORAL ONCE
Status: DISCONTINUED | OUTPATIENT
Start: 2022-01-01 | End: 2022-01-01 | Stop reason: HOSPADM

## 2022-01-01 RX ORDER — MORPHINE SULFATE 4 MG/ML
4 INJECTION, SOLUTION INTRAMUSCULAR; INTRAVENOUS
Status: DISCONTINUED | OUTPATIENT
Start: 2022-01-01 | End: 2022-01-01 | Stop reason: HOSPADM

## 2022-01-01 RX ORDER — OXYCODONE HYDROCHLORIDE 5 MG/1
5 TABLET ORAL ONCE
Status: DISCONTINUED | OUTPATIENT
Start: 2022-01-01 | End: 2022-01-01

## 2022-01-01 RX ORDER — MORPHINE SULFATE 20 MG/ML
5 SOLUTION ORAL ONCE
Status: COMPLETED | OUTPATIENT
Start: 2022-01-01 | End: 2022-01-01

## 2022-01-01 RX ADMIN — SODIUM CHLORIDE, POTASSIUM CHLORIDE, SODIUM LACTATE AND CALCIUM CHLORIDE 1000 ML: 600; 310; 30; 20 INJECTION, SOLUTION INTRAVENOUS at 18:57

## 2022-01-01 RX ADMIN — MORPHINE SULFATE 5 MG: 10 SOLUTION ORAL at 20:31

## 2022-01-01 ASSESSMENT — ENCOUNTER SYMPTOMS
DIZZINESS: 0
BACK PAIN: 0
MYALGIAS: 0
WOUND: 1
ARTHRALGIAS: 0
NECK PAIN: 0
LIGHT-HEADEDNESS: 0

## 2022-01-01 ASSESSMENT — ACTIVITIES OF DAILY LIVING (ADL)
ADLS_ACUITY_SCORE: 35
ADLS_ACUITY_SCORE: 35

## 2022-07-12 NOTE — ED TRIAGE NOTES
Pt. Brought in via EMS. Pt. Brought from her memory care facility. Pt. Hand an unwitnessed fall and was down for about 10 minutes. No blood thinners. + head strike. Ecchymotic to left eye. Lac to posterior left head. Hx: Alzheimers, AV block, HTN. VVS. Pt. Placed in C-collar upon arrival.      Triage Assessment     Row Name 07/12/22 1556       Triage Assessment (Adult)    Airway WDL WDL       Respiratory WDL    Respiratory WDL WDL       Skin Circulation/Temperature WDL    Skin Circulation/Temperature WDL X  contusion to left eye, ecchymotic. bump/lac to right posterior head.        Cardiac WDL    Cardiac WDL WDL       Peripheral/Neurovascular WDL    Peripheral Neurovascular WDL WDL       Cognitive/Neuro/Behavioral WDL    Cognitive/Neuro/Behavioral WDL X    Level of Consciousness confused  at baseline    Arousal Level arouses to voice    Orientation person

## 2022-07-12 NOTE — ED PROVIDER NOTES
History     Chief Complaint:  Fall     HPI:  The history is provided by the patient and the EMS personnel. History limited by: dementia.      Alejandra Paulino is a 89 year old female with a history of Alzheimer's dementia, hypertension, hyperlipidemia, AV block who presents after an unwitnessed fall which occurred at her [facility] care facility, [facility], just prior to arrival. EMS reports that the fall was unwitnessed and she was down for approximately 10 minutes before staff arrived to assist her. EMS noted trauma to her head with a laceration to her left posterior scalp. There is also bruising around her left eye; however, her daughter later notes that this has been present since a fall she had 4 days ago. She was placed in a C-collar by EMS. Per notes from her [facility] care facility, she has had 3 falls in the past 4 days. Here in the ED, Alejandra denies experiencing dizziness or lightheadedness prior to her fall today. She denies any pain including to her upper or lower extremities.     Review of Systems   Unable to perform ROS: Dementia   Musculoskeletal: Negative for arthralgias, back pain, myalgias and neck pain.   Skin: Positive for wound.   Neurological: Negative for dizziness and light-headedness.     Allergies:  The patient has no known allergies.     Medications:  Ativan  Lisinopril  Metoprolol  Mirtazapine    Tramadol   Aricept  Oxybutynin  Zoloft    Past Medical History:    Closed fracture of ramus of left pubis  Hypertension  Hyperlipidemia  Alzheimer's disease, dementia   Anxiety  Anemia  UTI  AV block  Urinary incontinence   Depression     Past Surgical History:    Pacemaker placement     Social History:  The patient presents to the ED alone.  The patient presents to the ED via EMS.   The patient resides in [facility] care at [facility].    Physical Exam     Patient Vitals for the past 24 hrs:   BP Temp Temp src Pulse Resp SpO2   07/12/22 1845 129/66 -- -- 90 -- 94 %   07/12/22 1830 131/86  -- -- 90 -- 94 %   07/12/22 1815 (!) 141/79 -- -- -- -- --   07/12/22 1800 133/64 -- -- 83 -- 93 %   07/12/22 1745 (!) 143/67 -- -- 82 -- 92 %   07/12/22 1702 -- 97.5  F (36.4  C) Oral -- -- --   07/12/22 1650 (!) 158/77 -- -- 86 20 90 %     Physical Exam  Constitutional: Vital signs reviewed as above.   Head: Head has B/L without raccoon's eyes (though bruising is worse on the left and seems to be in varying stages of healing). No Diaz's sign. No external signs of basilar skull fracture. No signs of facial bone instability.  Eyes: Conjunctivae and EOM are normal. Pupils are equal, round, and reactive to light.  ENT:  Right Ear: External ear and ear canal normal. No lacerations. No mastoid tenderness. No hemotympanum.   Left Ear: External ear and ear canal normal. No lacerations. No mastoid tenderness. No hemotympanum.   Nose: No nose lacerations, nasal deformity, septal deviation or nasal septal hematoma. No epistaxis.   Mouth/Throat: Uvula is midline, oropharynx is clear and moist and mucous membranes are normal.   Neck: C-collar in place. No spinous process tenderness present. No tracheal deviation present.   Cardiovascular: Normal rate, regular rhythm, S1 normal, S2 normal and normal pulses.   Pulmonary/Chest: Effort normal and breath sounds normal. No accessory muscle usage. No respiratory distress. Patient has no decreased breath sounds. Patient has no wheezes. Patient has no rhonchi. Patient has no rales. Patient exhibits no bony tenderness and no retraction.   Abdominal: Soft. Normal appearance and bowel sounds are normal. Patient exhibits no distension. There is no tenderness. There is no rebound.   Musculoskeletal/Extremities:        Back:              Cervical back: Normal. No midline TTP.               Thoracic back: Normal. No midline TTP.              Lumbar back: Normal.  No midline TTP.       Pelvis:              No ASIS tenderness to palpation              No pelvic pain or instability to  compression       Extremities:              No gross deformities or tenderness on the B/L clavicles, upper extremities or lower extremities.  Neurological: Patient is alert. She has dementia but will follow commands. GCS eye subscore is 4. GCS verbal subscore is 5. GCS motor subscore is 6.   Skin: Notable bruising on the face. Contusion on posterior right scalp, bruising on left hand/wrist.  Psych: Calm    Emergency Department Course     ECG  ECG taken at 1741, ECG read at 1753  Ventricular-paced rhythm with frequent premature ventricular complexes  Abnormal ECG  No prior ECG is available for comparison.  Rate 103 bpm. OH interval * ms. QRS duration 174 ms. QT/QTc 424/555 ms. P-R-T axes 79 -72 82.     Imaging:  Cervical spine CT w/o contrast   Final Result   IMPRESSION:   HEAD CT:   1.  No CT evidence for acute intracranial process.   2.  Brain atrophy and presumed chronic microvascular ischemic changes as above.      FACIAL BONE CT:   1.  Fractured roof of the left orbit involving the superior orbital rim.   2.  Fracture medial wall left orbit superiorly.   3.  No retro-orbital or intraorbital hematoma.   4.  Fracture left nasal bone and frontal process of the maxillary bone.      CERVICAL SPINE CT:   1.  No fracture or posttraumatic subluxation.         CT Facial Bones without Contrast   Final Result   IMPRESSION:   HEAD CT:   1.  No CT evidence for acute intracranial process.   2.  Brain atrophy and presumed chronic microvascular ischemic changes as above.      FACIAL BONE CT:   1.  Fractured roof of the left orbit involving the superior orbital rim.   2.  Fracture medial wall left orbit superiorly.   3.  No retro-orbital or intraorbital hematoma.   4.  Fracture left nasal bone and frontal process of the maxillary bone.      CERVICAL SPINE CT:   1.  No fracture or posttraumatic subluxation.         Head CT w/o contrast   Final Result   IMPRESSION:   HEAD CT:   1.  No CT evidence for acute intracranial process.    2.  Brain atrophy and presumed chronic microvascular ischemic changes as above.      FACIAL BONE CT:   1.  Fractured roof of the left orbit involving the superior orbital rim.   2.  Fracture medial wall left orbit superiorly.   3.  No retro-orbital or intraorbital hematoma.   4.  Fracture left nasal bone and frontal process of the maxillary bone.      CERVICAL SPINE CT:   1.  No fracture or posttraumatic subluxation.         Report per radiology    Laboratory:  Labs Ordered and Resulted from Time of ED Arrival to Time of ED Departure   CBC WITH PLATELETS AND DIFFERENTIAL - Abnormal       Result Value    WBC Count 6.7      RBC Count 3.26 (*)     Hemoglobin 9.9 (*)     Hematocrit 31.8 (*)     MCV 98      MCH 30.4      MCHC 31.1 (*)     RDW 12.8      Platelet Count 176      % Neutrophils 64      % Lymphocytes 19      % Monocytes 12      % Eosinophils 3      % Basophils 1      % Immature Granulocytes 1      NRBCs per 100 WBC 0      Absolute Neutrophils 4.3      Absolute Lymphocytes 1.3      Absolute Monocytes 0.8      Absolute Eosinophils 0.2      Absolute Basophils 0.1      Absolute Immature Granulocytes 0.1      Absolute NRBCs 0.0     BASIC METABOLIC PANEL - Normal    Sodium 133      Potassium 5.0      Chloride 102      Carbon Dioxide (CO2) 25      Anion Gap 6      Urea Nitrogen 15      Creatinine 0.64      Calcium 8.9      Glucose 96      GFR Estimate 84        Emergency Department Course:       Reviewed:  I reviewed nursing notes, vitals, past medical history and Care Everywhere    Assessments/Consults:  ED Course as of 07/13/22 0224 Tue Jul 12, 2022 1705 Dr. Joshi evaluated the patient and obtained history.   1959 D/[place]. [place] [place] Will talk with his Chief resident and call us back.   2029 D/W Wilman BO. Can see in clinic tomorrow @ 130PM   2032 The patient was rechecked and updated. I clarified information regarding her presentation.   2041 The patient was rechecked and updated.        Disposition:  The patient was discharged to home.     Impression & Plan     Medical Decision Making:  Alejandra Paulino is a 89 year old female who presents to the emergency department for evaluation after a fall.  Please see the HPI and exam for specifics.  Fortunately, there is no intracranial hemorrhage noted.  The patient was found to have orbital bone fractures and nasal bone fractures.  I talked with oral surgery and they would like to see the patient in the office tomorrow afternoon.  Family was otherwise comfortable with this and the patient was discharged into their care for follow-up as noted.    Diagnosis:    ICD-10-CM    1. Closed fracture of roof of left orbit, initial encounter (H)  S02.122A    2. Closed fracture of nasal bone, initial encounter  S02.2XXA    3. Closed fracture of maxilla, unspecified laterality, initial encounter (H)  S02.401A    4. Closed fracture of orbit, initial encounter (H)  S02.85XA     medial wall   5. Scleral hemorrhage of left eye  H11.32    6. Contusion of scalp, initial encounter  S00.03XA      Scribe Disclosure:  YANCI, Hannah Quintero, am serving as a scribe at 5:05 PM on 7/12/2022 to document services personally performed by Benjamin Joshi DO based on my observations and the provider's statements to me.      Benjamin Joshi DO  07/13/22 0225

## 2022-07-13 NOTE — DISCHARGE INSTRUCTIONS
What do you do next:   Continue your home medications unless we have specifically changed them  Do not blow your nose until you are instructed by your facial surgeon  There was no laceration that needed repairing on the back of your head from your fall today  Follow up as indicated below    When do you return: If you have uncontrollable pain, vision changes, severe dizziness, or any other symptoms that concern you, please return to the ED for reevaluation.    Thank you for allowing us to care for you today.

## 2022-09-10 NOTE — ED NOTES
Bed: ED08  Expected date:   Expected time:   Means of arrival:   Comments:  Gwen [phone] F fall possible hip displacement ETA 9025

## 2022-09-10 NOTE — ED TRIAGE NOTES
Pt from memory care, unwitnessed fall. Unknown if on blood thinners or if hit head. Pt's only complaint is of right hip.

## 2022-09-10 NOTE — ED PROVIDER NOTES
History     Chief Complaint:  Fall and Hip Pain       HPI   Alejadnra Paulino is a 89 year old female, DNR and on hospice, who had an unwitnessed fall.  She has shortening of the right leg and pain with movement of the right leg.  Daughter states that the patient has advanced dementia, and is currently living in a memory care unit at [facility].  She requires a two-person assist, and is in a wheelchair at baseline.  Staff state that she has difficulty adhering to recommendations to stay in wheelchair, she often forgets and will try to ambulate.  The patient has had multiple falls related to this, including a recent pelvic fracture, and multiple vertebral body compression fractures.  For that, she takes tramadol 25 mg twice daily.  She has been on hospice care since July 2022 secondary to acute worsening of her dementia.    ROS:  Review of Systems   Unable to perform ROS: Dementia     Allergies:  No Known Allergies     Medications:    acetaminophen (TYLENOL) 500 MG tablet  acetaminophen (TYLENOL) 500 MG tablet  Calcium Carbonate (CALCIUM-CARB 600 PO)  donepezil (ARICEPT) 10 MG tablet  glucosamine-chondroitin 500-400 MG CAPS per capsule  lisinopril (ZESTRIL) 5 MG tablet  metoprolol tartrate (LOPRESSOR) 50 MG tablet  multivitamin, therapeutic (THERA-VIT) TABS tablet  oxybutynin (DITROPAN) 5 MG tablet  sertraline (ZOLOFT) 50 MG tablet  traMADol (ULTRAM) 50 MG tablet      Past Medical History:    No past medical history on file.    Past Surgical History:    No past surgical history on file.     Family History:    family history is not on file.    Social History:     PCP: Shruthi Hernandez Physician     Physical Exam   Patient Vitals for the past 24 hrs:   BP Temp Temp src Pulse Resp SpO2   09/10/22 1805 (!) 151/76 97.8  F (36.6  C) Oral 74 18 100 %        Physical Exam  General: alert, lying comfortably on gurney  HENT: mucous membranes dry  CV: regular rate, regular rhythm  Resp: normal effort, clear throughout, no  crackles or wheezing  GI: abdomen soft and nontender, no guarding  MSK: no bony tenderness  Skin: appropriately warm and dry  Extremities: FROM in BUE and LLE without pain.  Pain with palpation of left trochanter.  Mild pain with ROM in the right hip, no pain in right knee or ankle.    Neuro: alert, pleasant.  Advanced dementia  Psych: pleasant, cooperative.      Emergency Department Course   ECG:  ECG results from 07/12/22   EKG 12-lead, tracing only     Value    Systolic Blood Pressure     Diastolic Blood Pressure     Ventricular Rate 103    Atrial Rate 84    OH Interval     QRS Duration 174        QTc 555    P Axis 79    R AXIS -72    T Axis 82    Interpretation ECG      Ventricular-paced rhythm with frequent Premature ventricular complexes  Abnormal ECG  When compared with ECG of 12-JUL-2022 17:41, (unconfirmed)  Vent. rate has decreased BY  38 BPM  Confirmed by - EMERGENCY ROOM, PHYSICIAN ([id number]),  EVELYNE ROSADO ([id number]) on 7/14/2022 6:38:09 AM         Imaging:  XR Pelvis w Hip Right 1 View   Final Result   IMPRESSION: Acute mildly displaced intertrochanteric fracture of the right femur without significant angulation. Old healed fractures of the left superior and inferior pubic rami. Mild degenerative changes in both hips. Osteopenia.         Report per radiology    Laboratory:  Labs Ordered and Resulted from Time of ED Arrival to Time of ED Departure   CBC WITH PLATELETS AND DIFFERENTIAL - Abnormal       Result Value    WBC Count 6.4      RBC Count 3.45 (*)     Hemoglobin 10.6 (*)     Hematocrit 33.8 (*)     MCV 98      MCH 30.7      MCHC 31.4 (*)     RDW 12.7      Platelet Count 195      % Neutrophils 61      % Lymphocytes 25      % Monocytes 9      % Eosinophils 4      % Basophils 1      % Immature Granulocytes 0      NRBCs per 100 WBC 0      Absolute Neutrophils 3.9      Absolute Lymphocytes 1.6      Absolute Monocytes 0.6      Absolute Eosinophils 0.3      Absolute Basophils 0.1      Absolute  Immature Granulocytes 0.0      Absolute NRBCs 0.0     BASIC METABOLIC PANEL - Normal    Sodium 138      Potassium 4.6      Chloride 104      Carbon Dioxide (CO2) 30      Anion Gap 4      Urea Nitrogen 24      Creatinine 0.85      Calcium 9.0      Glucose 85      GFR Estimate 65     COVID-19 VIRUS (CORONAVIRUS) BY PCR - Normal    SARS CoV2 PCR Negative        Emergency Department Course:    Reviewed:  I reviewed nursing notes, vitals, past medical history and Care Everywhere    Assessments:   I obtained history and examined the patient as noted above.    I rechecked the patient multiple times.  She started becoming slightly agitated in the ED given late time of night.  Pain seems well controlled.      Consults:   6:36 PM  I spoke to patient's daughter regarding plan of care.    8:08 PM  I spoke to the nurse on call for [facility].  Can have someone go out to see her tonight. ED RN spoke to Gabe.      Interventions:  Medications   morphine (PF) injection 4 mg (has no administration in time range)   lactated ringers BOLUS 1,000 mL (has no administration in time range)        Disposition:  The patient was discharged to home.     Impression & Plan      Medical Decision Making:  Alejandra Paulino is a 89 year old female with history of advanced dementia, wheelchair-bound at baseline, with two-person assist, on hospice, who presents today following an unwitnessed fall.  On exam, the patient is pleasant, but has advanced dementia and cannot recall the circumstances of her fall.  Daughter shares that she has had multiple years in the setting of falls.  The patient has been on hospice since July of this year, when her dementia I precipitously worsened.  Labs are unremarkable.  EKG shows a paced rhythm.  X-ray of the pelvis demonstrates a intertrochanteric fracture.  I discussed with the patient's daughter as well as hospice nurse on call, that hip surgery would not be aligned with the patient's goals of care,  especially since she is not ambulatory at this point.  Daughter is in agreement this plan.  Hospice nurse will have somebody check in with the patient this evening.  In the meantime, she will be started on morphine for breakthrough pain.  We will continue to maximize all supportive measures to ensure that the patient is comfortable.  At this point, she can return back to her facility given that she already has maximal assist.      Diagnosis:    ICD-10-CM    1. Hip fracture, right, closed, initial encounter (H)  S72.001A         Discharge Medications:  Discharge Medication List as of 9/10/2022  9:55 PM      START taking these medications    Details   morphine 10 MG/5ML solution Take 2.5 mLs (5 mg) by mouth every 4 hours as needed for moderate to severe pain, Disp-40 mL, R-0, Local Print              9/10/2022   Kim Leavitt MD Pepper, Tracy Lynn, MD  09/11/22 [id number]

## 2022-09-11 NOTE — DISCHARGE INSTRUCTIONS
Alejandra has sustained a right-sided hip fracture.  Using shared decision making between the patient, daughter, and hospice nurse, we have decided that hip surgery would not be aligned with Alejandra's goals of care.  She is not ambulatory at baseline.  She should be nonweightbearing on the right leg if possible, though we are aware that she often forgets that she should to be sitting in the wheelchair.  We will continue to make every effort to minimize pain and maximize her quality of life.  She will be prescribed liquid morphine for breakthrough pain.  Continued symptomatic care per the hospice team.

## 2022-09-11 NOTE — ED NOTES
Gabe Dai Memory care nurse called and updated with plan of care for the pt to return to memory care after ensuring she can tolerate sitting and transferring into and out of a wheelchair.

## 2022-09-16 NOTE — ED NOTES
Pt given 5mg of Morphine sulfate orally prior to departure via EMS. Unable to complete in MAR due to pt being discharged prior to charting of medication.